# Patient Record
Sex: FEMALE | Race: WHITE | NOT HISPANIC OR LATINO | Employment: FULL TIME | URBAN - METROPOLITAN AREA
[De-identification: names, ages, dates, MRNs, and addresses within clinical notes are randomized per-mention and may not be internally consistent; named-entity substitution may affect disease eponyms.]

---

## 2017-01-20 ENCOUNTER — VIRTUAL VISIT (OUTPATIENT)
Dept: FAMILY MEDICINE | Facility: OTHER | Age: 37
End: 2017-01-20

## 2017-01-20 NOTE — PROGRESS NOTES
"Date: 2017 08:31:01  Clinician: Frederick Morrison  Clinician NPI: [id number]  Patient: Yvonne Sesay  Patient : 1980  Patient Address: [address] [place] [address]  Patient Phone: [phone]  Visit Protocol: URI  Patient Summary:  Yvonne is a 36 year old ( : 1980 ) female who initiated a Zip for a presumed sinus infection. When asked the question \"Do you have a [place] primary care physician?\", Yvonne responded \"Yes\".     Her symptoms started gradually 3-6 days ago and consist of loss of appetite, post-nasal drainage, cough, myalgias, malaise, rhinitis, nasal congestion, and ear pain.   She denies fever, chest pain, dysphagia, nausea, vomiting, itchy eyes, chills, sore throat, hoarse voice, and dyspnea. She denies a history of facial surgery.   Her moderate nasal secretions are yellow. Her mild facial pain or pressure feels worse when bending over or leaning forward and is located on one side of her head. The facial pain or pressure started after the onset of other URI symptoms.  Yvonne has a mild headache. The headache did not start before her other symptoms and is located on one side of her head. Yvonne does not have a history of migraine headaches. When asked to feel her neck she reported enlarged lymph nodes. Yvonne noted that the enlarged neck lymph nodes developed prior to the onset of URI symptoms. She denies axillary lymphadenopathy.   Regarding the ear pain, the patient denies experiencing pain when gently pulling on the earlobe, pain if the mouth is fully open or teeth are clenched, mastoid tenderness, tinnitus, and recent injury to the area around the ear.   She reports having mild ear pain on the ear canal area of both ears for 2-4 days. The patient hears normally despite the ear pain.   Yvonne reports having a feeling of fullness in the ear as if it is clogged on the following locations: Ear Canal.   Yvonne denies having redness, swelling, " and tenderness on her ear.   Additionally, she does not experience pain when bending the chin to the chest.   She has never had tympanostomy tube placement.    Her mild (a few coughs/hr) non-productive cough is more bothersome at night. She believes the cough is caused by post-nasal drainage.   She has passed urine in the past 12 hours.   Yvonne denies having COPD or other chronic lung disease.   Pulse: self-reported pulse rate as: 11 beats in 10 seconds.    Weight (in lbs): 130   She states she is not pregnant and denies breastfeeding. She has menstruated in the past month.   Yvonne does not smoke or use smokeless tobacco.       MEDICATIONS: No current medications, ALLERGIES: NKDA  Clinician Response:  Dear Yvonne,  Based on the information you have provided, you likely have a viral upper respiratory infection, otherwise known as a 'cold'.   Your symptoms are consistent with a possible sinus infection. Most sinus infections are caused by viruses and will resolve in the next few days without antibiotics. Antibiotics are only recommended if your sinus infection is accompanied by a high temperature or persists longer than 10 days.  The duration of your sinus symptoms do not meet the criteria for a bacterial infection or antibiotic treatment. However, if you continue to have sinus pain and pressure longer than 10 days, please consider doing another visit with us for additional evaluation and treatment recommendations.   Drink plenty of liquids, especially water and take time to rest your body. This may mean taking a nap or going to bed earlier. Your body is fighting an infection and liquids and rest will improve the pace of recovery. Remember to regularly wash your hands and avoid close contact with others to prevent spreading your infection.   Diagnosis: Viral URI  Diagnosis ICD: J06.9  Addendum created: May 16 11:25:18, 2020 created by: Radha Ellison body: Last visit I can see from oncare is from 1/20/2017.  Patient needs to do a new oncare visit for current assessment of symptoms. We could do a phone step up then if needed.    Thank You

## 2017-11-12 ENCOUNTER — HEALTH MAINTENANCE LETTER (OUTPATIENT)
Age: 37
End: 2017-11-12

## 2018-03-16 ENCOUNTER — ANESTHESIA (OUTPATIENT)
Dept: SURGERY | Facility: CLINIC | Age: 38
End: 2018-03-16
Payer: COMMERCIAL

## 2018-03-16 ENCOUNTER — HOSPITAL ENCOUNTER (OUTPATIENT)
Facility: CLINIC | Age: 38
Discharge: HOME | End: 2018-03-17
Attending: EMERGENCY MEDICINE | Admitting: SURGERY
Payer: COMMERCIAL

## 2018-03-16 ENCOUNTER — SURGERY (OUTPATIENT)
Age: 38
End: 2018-03-16

## 2018-03-16 ENCOUNTER — ANESTHESIA EVENT (OUTPATIENT)
Dept: SURGERY | Facility: CLINIC | Age: 38
End: 2018-03-16
Payer: COMMERCIAL

## 2018-03-16 ENCOUNTER — APPOINTMENT (OUTPATIENT)
Dept: CT IMAGING | Facility: CLINIC | Age: 38
End: 2018-03-16
Attending: EMERGENCY MEDICINE
Payer: COMMERCIAL

## 2018-03-16 ENCOUNTER — TELEPHONE (OUTPATIENT)
Dept: INTERNAL MEDICINE | Facility: CLINIC | Age: 38
End: 2018-03-16

## 2018-03-16 DIAGNOSIS — K35.30 ACUTE APPENDICITIS WITH LOCALIZED PERITONITIS: ICD-10-CM

## 2018-03-16 DIAGNOSIS — G89.18 ACUTE POST-OPERATIVE PAIN: Primary | ICD-10-CM

## 2018-03-16 LAB
ALBUMIN UR-MCNC: 10 MG/DL
ANION GAP SERPL CALCULATED.3IONS-SCNC: 5 MMOL/L (ref 3–14)
APPEARANCE UR: CLEAR
BASOPHILS # BLD AUTO: 0 10E9/L (ref 0–0.2)
BASOPHILS NFR BLD AUTO: 0.1 %
BILIRUB UR QL STRIP: NEGATIVE
BUN SERPL-MCNC: 11 MG/DL (ref 7–30)
CALCIUM SERPL-MCNC: 8.4 MG/DL (ref 8.5–10.1)
CHLORIDE SERPL-SCNC: 107 MMOL/L (ref 94–109)
CO2 SERPL-SCNC: 26 MMOL/L (ref 20–32)
COLOR UR AUTO: YELLOW
CREAT SERPL-MCNC: 0.74 MG/DL (ref 0.52–1.04)
DIFFERENTIAL METHOD BLD: ABNORMAL
EOSINOPHIL # BLD AUTO: 0.1 10E9/L (ref 0–0.7)
EOSINOPHIL NFR BLD AUTO: 0.4 %
ERYTHROCYTE [DISTWIDTH] IN BLOOD BY AUTOMATED COUNT: 11.9 % (ref 10–15)
GFR SERPL CREATININE-BSD FRML MDRD: 89 ML/MIN/1.7M2
GLUCOSE SERPL-MCNC: 95 MG/DL (ref 70–99)
GLUCOSE UR STRIP-MCNC: NEGATIVE MG/DL
HCG SERPL QL: NEGATIVE
HCT VFR BLD AUTO: 39.7 % (ref 35–47)
HGB BLD-MCNC: 13.6 G/DL (ref 11.7–15.7)
HGB UR QL STRIP: ABNORMAL
IMM GRANULOCYTES # BLD: 0 10E9/L (ref 0–0.4)
IMM GRANULOCYTES NFR BLD: 0.2 %
KETONES UR STRIP-MCNC: 10 MG/DL
LEUKOCYTE ESTERASE UR QL STRIP: NEGATIVE
LYMPHOCYTES # BLD AUTO: 1.9 10E9/L (ref 0.8–5.3)
LYMPHOCYTES NFR BLD AUTO: 13.6 %
MCH RBC QN AUTO: 29.8 PG (ref 26.5–33)
MCHC RBC AUTO-ENTMCNC: 34.3 G/DL (ref 31.5–36.5)
MCV RBC AUTO: 87 FL (ref 78–100)
MONOCYTES # BLD AUTO: 1.3 10E9/L (ref 0–1.3)
MONOCYTES NFR BLD AUTO: 8.9 %
MUCOUS THREADS #/AREA URNS LPF: PRESENT /LPF
NEUTROPHILS # BLD AUTO: 10.9 10E9/L (ref 1.6–8.3)
NEUTROPHILS NFR BLD AUTO: 76.8 %
NITRATE UR QL: NEGATIVE
NRBC # BLD AUTO: 0 10*3/UL
NRBC BLD AUTO-RTO: 0 /100
PH UR STRIP: 6 PH (ref 5–7)
PLATELET # BLD AUTO: 192 10E9/L (ref 150–450)
POTASSIUM SERPL-SCNC: 4.2 MMOL/L (ref 3.4–5.3)
RBC # BLD AUTO: 4.56 10E12/L (ref 3.8–5.2)
RBC #/AREA URNS AUTO: <1 /HPF (ref 0–2)
SODIUM SERPL-SCNC: 138 MMOL/L (ref 133–144)
SOURCE: ABNORMAL
SP GR UR STRIP: 1.02 (ref 1–1.03)
SQUAMOUS #/AREA URNS AUTO: 1 /HPF (ref 0–1)
UROBILINOGEN UR STRIP-MCNC: NORMAL MG/DL (ref 0–2)
WBC # BLD AUTO: 14.2 10E9/L (ref 4–11)
WBC #/AREA URNS AUTO: 1 /HPF (ref 0–5)

## 2018-03-16 PROCEDURE — 25000566 ZZH SEVOFLURANE, EA 15 MIN: Performed by: SURGERY

## 2018-03-16 PROCEDURE — 88304 TISSUE EXAM BY PATHOLOGIST: CPT | Mod: 26 | Performed by: SURGERY

## 2018-03-16 PROCEDURE — 25000128 H RX IP 250 OP 636: Performed by: EMERGENCY MEDICINE

## 2018-03-16 PROCEDURE — 96365 THER/PROPH/DIAG IV INF INIT: CPT | Mod: 59

## 2018-03-16 PROCEDURE — 25000125 ZZHC RX 250: Performed by: EMERGENCY MEDICINE

## 2018-03-16 PROCEDURE — 27210995 ZZH RX 272: Performed by: SURGERY

## 2018-03-16 PROCEDURE — 25000125 ZZHC RX 250: Performed by: SURGERY

## 2018-03-16 PROCEDURE — 25000128 H RX IP 250 OP 636: Performed by: SURGERY

## 2018-03-16 PROCEDURE — 25000128 H RX IP 250 OP 636: Performed by: ANESTHESIOLOGY

## 2018-03-16 PROCEDURE — 37000009 ZZH ANESTHESIA TECHNICAL FEE, EACH ADDTL 15 MIN: Performed by: SURGERY

## 2018-03-16 PROCEDURE — C9399 UNCLASSIFIED DRUGS OR BIOLOG: HCPCS | Performed by: NURSE ANESTHETIST, CERTIFIED REGISTERED

## 2018-03-16 PROCEDURE — 81001 URINALYSIS AUTO W/SCOPE: CPT | Performed by: EMERGENCY MEDICINE

## 2018-03-16 PROCEDURE — 25800025 ZZH RX 258: Performed by: SURGERY

## 2018-03-16 PROCEDURE — 74177 CT ABD & PELVIS W/CONTRAST: CPT

## 2018-03-16 PROCEDURE — 36000058 ZZH SURGERY LEVEL 3 EA 15 ADDTL MIN: Performed by: SURGERY

## 2018-03-16 PROCEDURE — 40000169 ZZH STATISTIC PRE-PROCEDURE ASSESSMENT I: Performed by: SURGERY

## 2018-03-16 PROCEDURE — 44970 LAPAROSCOPY APPENDECTOMY: CPT | Performed by: SURGERY

## 2018-03-16 PROCEDURE — 25000125 ZZHC RX 250: Performed by: NURSE ANESTHETIST, CERTIFIED REGISTERED

## 2018-03-16 PROCEDURE — 27210794 ZZH OR GENERAL SUPPLY STERILE: Performed by: SURGERY

## 2018-03-16 PROCEDURE — 71000012 ZZH RECOVERY PHASE 1 LEVEL 1 FIRST HR: Performed by: SURGERY

## 2018-03-16 PROCEDURE — 84703 CHORIONIC GONADOTROPIN ASSAY: CPT | Performed by: EMERGENCY MEDICINE

## 2018-03-16 PROCEDURE — 85025 COMPLETE CBC W/AUTO DIFF WBC: CPT | Performed by: EMERGENCY MEDICINE

## 2018-03-16 PROCEDURE — 25000128 H RX IP 250 OP 636: Performed by: NURSE ANESTHETIST, CERTIFIED REGISTERED

## 2018-03-16 PROCEDURE — 88304 TISSUE EXAM BY PATHOLOGIST: CPT | Performed by: SURGERY

## 2018-03-16 PROCEDURE — 99285 EMERGENCY DEPT VISIT HI MDM: CPT | Mod: 25

## 2018-03-16 PROCEDURE — 80048 BASIC METABOLIC PNL TOTAL CA: CPT | Performed by: EMERGENCY MEDICINE

## 2018-03-16 PROCEDURE — 37000008 ZZH ANESTHESIA TECHNICAL FEE, 1ST 30 MIN: Performed by: SURGERY

## 2018-03-16 PROCEDURE — 36000056 ZZH SURGERY LEVEL 3 1ST 30 MIN: Performed by: SURGERY

## 2018-03-16 PROCEDURE — 99203 OFFICE O/P NEW LOW 30 MIN: CPT | Mod: 57 | Performed by: SURGERY

## 2018-03-16 RX ORDER — DEXAMETHASONE SODIUM PHOSPHATE 4 MG/ML
INJECTION, SOLUTION INTRA-ARTICULAR; INTRALESIONAL; INTRAMUSCULAR; INTRAVENOUS; SOFT TISSUE PRN
Status: DISCONTINUED | OUTPATIENT
Start: 2018-03-16 | End: 2018-03-16

## 2018-03-16 RX ORDER — ONDANSETRON 4 MG/1
4 TABLET, ORALLY DISINTEGRATING ORAL EVERY 30 MIN PRN
Status: DISCONTINUED | OUTPATIENT
Start: 2018-03-16 | End: 2018-03-17 | Stop reason: HOSPADM

## 2018-03-16 RX ORDER — ONDANSETRON 2 MG/ML
4 INJECTION INTRAMUSCULAR; INTRAVENOUS EVERY 30 MIN PRN
Status: DISCONTINUED | OUTPATIENT
Start: 2018-03-16 | End: 2018-03-17 | Stop reason: HOSPADM

## 2018-03-16 RX ORDER — HYDRALAZINE HYDROCHLORIDE 20 MG/ML
2.5-5 INJECTION INTRAMUSCULAR; INTRAVENOUS EVERY 10 MIN PRN
Status: DISCONTINUED | OUTPATIENT
Start: 2018-03-16 | End: 2018-03-17 | Stop reason: HOSPADM

## 2018-03-16 RX ORDER — IOPAMIDOL 755 MG/ML
68 INJECTION, SOLUTION INTRAVASCULAR ONCE
Status: COMPLETED | OUTPATIENT
Start: 2018-03-16 | End: 2018-03-16

## 2018-03-16 RX ORDER — PROPOFOL 10 MG/ML
INJECTION, EMULSION INTRAVENOUS CONTINUOUS PRN
Status: DISCONTINUED | OUTPATIENT
Start: 2018-03-16 | End: 2018-03-16

## 2018-03-16 RX ORDER — ALBUTEROL SULFATE 0.83 MG/ML
2.5 SOLUTION RESPIRATORY (INHALATION) EVERY 4 HOURS PRN
Status: DISCONTINUED | OUTPATIENT
Start: 2018-03-16 | End: 2018-03-17 | Stop reason: HOSPADM

## 2018-03-16 RX ORDER — PROPOFOL 10 MG/ML
INJECTION, EMULSION INTRAVENOUS PRN
Status: DISCONTINUED | OUTPATIENT
Start: 2018-03-16 | End: 2018-03-16

## 2018-03-16 RX ORDER — MAGNESIUM HYDROXIDE 1200 MG/15ML
LIQUID ORAL PRN
Status: DISCONTINUED | OUTPATIENT
Start: 2018-03-16 | End: 2018-03-17 | Stop reason: HOSPADM

## 2018-03-16 RX ORDER — LIDOCAINE HYDROCHLORIDE 20 MG/ML
INJECTION, SOLUTION INFILTRATION; PERINEURAL PRN
Status: DISCONTINUED | OUTPATIENT
Start: 2018-03-16 | End: 2018-03-16

## 2018-03-16 RX ORDER — SODIUM CHLORIDE, SODIUM LACTATE, POTASSIUM CHLORIDE, CALCIUM CHLORIDE 600; 310; 30; 20 MG/100ML; MG/100ML; MG/100ML; MG/100ML
INJECTION, SOLUTION INTRAVENOUS CONTINUOUS
Status: DISCONTINUED | OUTPATIENT
Start: 2018-03-16 | End: 2018-03-16 | Stop reason: HOSPADM

## 2018-03-16 RX ORDER — LABETALOL HYDROCHLORIDE 5 MG/ML
10 INJECTION, SOLUTION INTRAVENOUS
Status: DISCONTINUED | OUTPATIENT
Start: 2018-03-16 | End: 2018-03-17 | Stop reason: HOSPADM

## 2018-03-16 RX ORDER — FENTANYL CITRATE 50 UG/ML
50 INJECTION, SOLUTION INTRAMUSCULAR; INTRAVENOUS
Status: DISCONTINUED | OUTPATIENT
Start: 2018-03-16 | End: 2018-03-16 | Stop reason: HOSPADM

## 2018-03-16 RX ORDER — PIPERACILLIN SODIUM, TAZOBACTAM SODIUM 3; .375 G/15ML; G/15ML
3.38 INJECTION, POWDER, LYOPHILIZED, FOR SOLUTION INTRAVENOUS ONCE
Status: COMPLETED | OUTPATIENT
Start: 2018-03-16 | End: 2018-03-16

## 2018-03-16 RX ORDER — HYDROCODONE BITARTRATE AND ACETAMINOPHEN 5; 325 MG/1; MG/1
1-2 TABLET ORAL EVERY 4 HOURS PRN
Qty: 20 TABLET | Refills: 0 | Status: SHIPPED | OUTPATIENT
Start: 2018-03-16 | End: 2018-10-23

## 2018-03-16 RX ORDER — FENTANYL CITRATE 50 UG/ML
INJECTION, SOLUTION INTRAMUSCULAR; INTRAVENOUS PRN
Status: DISCONTINUED | OUTPATIENT
Start: 2018-03-16 | End: 2018-03-16

## 2018-03-16 RX ORDER — ONDANSETRON 2 MG/ML
INJECTION INTRAMUSCULAR; INTRAVENOUS PRN
Status: DISCONTINUED | OUTPATIENT
Start: 2018-03-16 | End: 2018-03-16

## 2018-03-16 RX ORDER — SODIUM CHLORIDE, SODIUM LACTATE, POTASSIUM CHLORIDE, CALCIUM CHLORIDE 600; 310; 30; 20 MG/100ML; MG/100ML; MG/100ML; MG/100ML
INJECTION, SOLUTION INTRAVENOUS CONTINUOUS
Status: DISCONTINUED | OUTPATIENT
Start: 2018-03-16 | End: 2018-03-17 | Stop reason: HOSPADM

## 2018-03-16 RX ORDER — KETOROLAC TROMETHAMINE 30 MG/ML
INJECTION, SOLUTION INTRAMUSCULAR; INTRAVENOUS PRN
Status: DISCONTINUED | OUTPATIENT
Start: 2018-03-16 | End: 2018-03-16

## 2018-03-16 RX ORDER — FENTANYL CITRATE 0.05 MG/ML
25-50 INJECTION, SOLUTION INTRAMUSCULAR; INTRAVENOUS
Status: DISCONTINUED | OUTPATIENT
Start: 2018-03-16 | End: 2018-03-17 | Stop reason: HOSPADM

## 2018-03-16 RX ORDER — HYDROMORPHONE HYDROCHLORIDE 1 MG/ML
.3-.5 INJECTION, SOLUTION INTRAMUSCULAR; INTRAVENOUS; SUBCUTANEOUS EVERY 5 MIN PRN
Status: DISCONTINUED | OUTPATIENT
Start: 2018-03-16 | End: 2018-03-17 | Stop reason: HOSPADM

## 2018-03-16 RX ADMIN — LIDOCAINE HYDROCHLORIDE 10 ML: 10 INJECTION, SOLUTION INFILTRATION; PERINEURAL at 22:49

## 2018-03-16 RX ADMIN — PROPOFOL 200 MG: 10 INJECTION, EMULSION INTRAVENOUS at 22:04

## 2018-03-16 RX ADMIN — MIDAZOLAM 2 MG: 1 INJECTION INTRAMUSCULAR; INTRAVENOUS at 21:58

## 2018-03-16 RX ADMIN — FENTANYL CITRATE 50 MCG: 50 INJECTION, SOLUTION INTRAMUSCULAR; INTRAVENOUS at 20:57

## 2018-03-16 RX ADMIN — SODIUM CHLORIDE 1000 ML: 900 IRRIGANT IRRIGATION at 22:15

## 2018-03-16 RX ADMIN — ROCURONIUM BROMIDE 35 MG: 10 INJECTION INTRAVENOUS at 22:13

## 2018-03-16 RX ADMIN — ONDANSETRON 4 MG: 2 INJECTION INTRAMUSCULAR; INTRAVENOUS at 22:15

## 2018-03-16 RX ADMIN — SUCCINYLCHOLINE CHLORIDE 100 MG: 20 INJECTION, SOLUTION INTRAMUSCULAR; INTRAVENOUS; PARENTERAL at 22:04

## 2018-03-16 RX ADMIN — PROPOFOL 50 MCG/KG/MIN: 10 INJECTION, EMULSION INTRAVENOUS at 22:11

## 2018-03-16 RX ADMIN — PIPERACILLIN AND TAZOBACTAM 3.38 G: 3; .375 INJECTION, POWDER, FOR SOLUTION INTRAVENOUS at 22:11

## 2018-03-16 RX ADMIN — SODIUM CHLORIDE, POTASSIUM CHLORIDE, SODIUM LACTATE AND CALCIUM CHLORIDE: 600; 310; 30; 20 INJECTION, SOLUTION INTRAVENOUS at 20:58

## 2018-03-16 RX ADMIN — DEXAMETHASONE SODIUM PHOSPHATE 4 MG: 4 INJECTION, SOLUTION INTRA-ARTICULAR; INTRALESIONAL; INTRAMUSCULAR; INTRAVENOUS; SOFT TISSUE at 22:11

## 2018-03-16 RX ADMIN — KETOROLAC TROMETHAMINE 30 MG: 30 INJECTION, SOLUTION INTRAMUSCULAR at 22:48

## 2018-03-16 RX ADMIN — IOPAMIDOL 68 ML: 755 INJECTION, SOLUTION INTRAVENOUS at 14:25

## 2018-03-16 RX ADMIN — SODIUM CHLORIDE 1000 ML: 9 INJECTION, SOLUTION INTRAVENOUS at 15:32

## 2018-03-16 RX ADMIN — ROCURONIUM BROMIDE 5 MG: 10 INJECTION INTRAVENOUS at 22:04

## 2018-03-16 RX ADMIN — LIDOCAINE HYDROCHLORIDE 100 MG: 20 INJECTION, SOLUTION INFILTRATION; PERINEURAL at 22:04

## 2018-03-16 RX ADMIN — SUGAMMADEX 120 MG: 100 INJECTION, SOLUTION INTRAVENOUS at 22:48

## 2018-03-16 RX ADMIN — SODIUM CHLORIDE 60 ML: 9 INJECTION, SOLUTION INTRAVENOUS at 14:26

## 2018-03-16 RX ADMIN — DEXMEDETOMIDINE HYDROCHLORIDE 12 MCG: 100 INJECTION, SOLUTION INTRAVENOUS at 22:12

## 2018-03-16 RX ADMIN — PIPERACILLIN SODIUM,TAZOBACTAM SODIUM 3.38 G: 3; .375 INJECTION, POWDER, FOR SOLUTION INTRAVENOUS at 15:28

## 2018-03-16 RX ADMIN — FENTANYL CITRATE 100 MCG: 50 INJECTION, SOLUTION INTRAMUSCULAR; INTRAVENOUS at 22:04

## 2018-03-16 ASSESSMENT — ENCOUNTER SYMPTOMS
NAUSEA: 1
VOMITING: 1
ABDOMINAL PAIN: 1
DIARRHEA: 0
FEVER: 0

## 2018-03-16 NOTE — PROGRESS NOTES
Pt laying in bed states tolerable RLQ pain. Declines any med intervention at this time. Waiting to go to OR. NPO. Will continue to monitor.

## 2018-03-16 NOTE — PROGRESS NOTES
RECEIVING UNIT ED HANDOFF REVIEW    ED Nurse Handoff Report was reviewed by: Sophia Larry on March 16, 2018 at 6:04 PM

## 2018-03-16 NOTE — H&P
[facility]  [facility] - H&P     Yvonne Sesay MRN# [id number]   Age: 37 year old YOB: 1980     HPI:  Patient has been experiencing acute RLQ abdominal pain for the past 1.5 days associated with chills, nausea and anorexia.  These symptoms have been increasing in severity.  Originally felt the pain yesterday after dinner.  Slept poorly and felt worse this morning.  Had some chills and associated nausea.  Presented to the emergency department with abdominal pain and an exam suspicious for acute appendicitis.  Was found to have a leukocytosis and CT scan suggested acute appendicitis.  No previous abdominal surgeries.    History is obtained from the patient    Review Of Systems:  Respiratory: No shortness of breath, dyspnea on exertion, cough, or hemoptysis  Cardiovascular: negative  Gastrointestinal: as above  Genitourinary: negative    PMH:  Past Medical History:   Diagnosis Date     MTHFR mutation (methylenetetrahydrofolate reductase) (H)      RPLS (reversible posterior leukoencephalopathy syndrome)        PSH:  Past Surgical History:   Procedure Laterality Date     ------------OTHER-------------      mole removal     teeth extraction[         Allergies:  No Known Allergies    Home Medications:  Current Outpatient Prescriptions   Medication Sig Dispense Refill     ibuprofen (ADVIL,MOTRIN) 400-800 mg tablet Take 1-2 tablets (400-800 mg) by mouth every 6 hours as needed for other (cramping) 90 tablet 0     folic acid (FOLATE) 500 mcg/mL SOLN Take 800 mcg by mouth daily       Prenatal MV-Min-Fe Fum-FA-DHA (PRENATAL 1 PO)          Social History:  Social History   Substance Use Topics     Smoking status: Current Every Day Smoker     Packs/day: 0.50     Types: Cigarettes     Smokeless tobacco: Never Used      Comment: would like information in regards to smoking cessation post delivery.     Alcohol use 0.0 oz/week      Comment: occ       Family History:  No family history  chronic diarrhea, inflammatory bowel disease or colon cancer.    Objective:  /77  Pulse 99  Temp 98.3  F (36.8  C) (Oral)  Resp 16  Wt 61.2 kg (135 lb)  SpO2 100%  BMI 22.47 kg/m2    General appearance: healthy, alert and mild distress  Hydration: mildly dehydrated  Neck: normal, supple and no adenopathy  Lungs: normal and clear to auscultation  Heart: regular rate and rhythm and no murmurs, clicks, or gallops  Abdomen: flat, hypoactive bowel sounds.   Tenderness: present: suprapubic and RLQ moderate  Masses: none  Organomegaly: none    Labs Reviewed:  Recent Labs      03/16/18   1259   HGB  13.6   WBC  14.2*       Radiology:  CT abdomen reveals a dilated, thick-walled appendix with surrounding fat stranding.  No evidence for perforation or abscess  All imaging studies reviewed by me.    ASSESSMENT/PLAN:  The patient's history, physical exam, laboratory and imaging studies are suspicious for acute appendicitis.  I have offered the patient laparoscopic appendectomy.  The risks, benefits, and alternatives have been discussed in detail.  All of the patient's questions have been answered.  They elect to proceed and we will go to the OR at the soonest availability.  Pre-operative antibiotics have been ordered.     Bertram Elder MD

## 2018-03-16 NOTE — IP AVS SNAPSHOT
MRN:[id number]                      After Visit Summary   3/16/2018    Yvonne Sesay    MRN: [id number]           Thank you!     Thank you for choosing [facility] for your care. Our goal is always to provide you with excellent care. Hearing back from our patients is one way we can continue to improve our services. Please take a few minutes to complete the written survey that you may receive in the mail after you visit with us. Thank you!        Patient Information     Date Of Birth          1980        About your hospital stay     You were admitted on:  March 17, 2018 [facility]    You were discharged on:  March 17, 2018       Who to Call     For medical emergencies, please call 911.  For non-urgent questions about your medical care, please call your primary care provider or clinic, [phone]  For questions related to your surgery, please call your surgery clinic        Attending Provider     Provider Specialty    Darshana Navarrete MD Emergency Medicine    Natalya, Pamella Morillo MD Emergency Medicine    Jammie Greenwood MD Surgery       Primary Care Provider Office Phone # Fax #    Claudio Ben Menon [phone]      After Care Instructions     Discharge Instructions       [facility]  Discharge Instructions: Post-Operative Appendectomy    ACTIVITY  Increase your activity gradually.  Avoid strenuous physical activity or heavy lifting greater than 15-20 lbs. for 2-3 weeks.  You may climb stairs.  You may drive without restrictions when you are not using any prescription pain medication and comfortable in a car.  You may return to work/school when you are comfortable without any prescription pain medication.    WOUND CARE  You may remove your bandage and shower 48 hours after the surgery.  Pat your incisions dry and leave open to air.  Re-apply dressing (Band-aid or gauze/tape) as needed  for drainage.  You may have steri-strips (looks like tape) or Dermabond (looks like glue) on your incision.  Leave it alone, it will peel up and fall off on its own.   Do not soak your incisions in a tub or pool for 2 weeks.     DIET  Return to the diet you were on before surgery.  Drink plenty of liquids to stay hydrated.    PAIN  Expect some tenderness and discomfort at the incision sites.  Use the prescribed pain medication at your discretion.  Expect gradual resolution of your pain over several days.  You may take ibuprofen with food (unless you have been told not to) instead of or in addition to your prescribed pain medication.  If you are taking Norco or Percocet, do not take any additional acetaminophen/APAP/Tylenol.  Do not drink alcohol or drive while you are taking pain medications.  You may apply ice to your incisions in 20 minute intervals as needed for the next 48 hours.  After that time, consider switching to heat if you prefer.    RETURN APPOINTMENT  Follow up with your surgeon or a PA in 2 weeks.  Please call the office at [phone] to schedule your appointment. We are located at [address] [place] [address].    CALL OUR OFFICE IF YOU HAVE:   Chills or fever above 101.5 F.  Increased redness or drainage at your incisions.  Significant bleeding.  Pain not relieved by your pain medication or rest.  Increasing pain after the first 48 hours.  Any other concerns or questions.    HELPFUL HINTS  Pain medications can cause constipation.  Limit use when possible.  Take over the counter stool softener/stimulant, such as Colace or Senna, with plenty of water.  You may take a mild over the counter laxative, such as Miralax or a suppository, as needed.   For laparoscopic surgery, you may have shoulder or upper back discomfort due to the gas used in surgery.  This is temporary and should resolve in 48-72 hours.  Short, frequent walks may help with this.            Ice to affected area     "   Ice to operative site PRN                  Additional Information     If you use hormonal birth control (such as the pill, patch, ring or implants): You'll need a second form of birth control for 7 days (condoms, a diaphragm or contraceptive foam). While in the hospital, you received a medicine called Bridion. Your normal birth control will not work as well for a week after taking this medicine.          Pending Results     Date and Time Order Name Status Description    3/16/2018 1308 CT Abdomen Pelvis w Contrast Preliminary             Admission Information     Date & Time Provider Department Dept. Phone    3/16/2018 Jammie Greenwood MD [facility] [phone]      Your Vitals Were     Blood Pressure Pulse Temperature Respirations Height Weight    121/71 99 96  F (35.6  C) (Oral) 16 1.676 m (5' 6\") 61 kg (134 lb 8 oz)    Pulse Oximetry BMI (Body Mass Index)                97% 21.71 kg/m2          MyChart Information     [employer] gives you secure access to your electronic health record. If you see a primary care provider, you can also send messages to your care team and make appointments. If you have questions, please call your primary care clinic.  If you do not have a primary care provider, please call [phone] and they will assist you.        Care EveryWhere ID     This is your Care EveryWhere ID. This could be used by other organizations to access your [place] medical records  [id number]        Equal Access to Services     MARLENE COLON : [place], wasaundra lundy, qaybta kaaldemar steele. So [facility] [phone].    ATENCIÓN: Si habla español, tiene a jo disposición servicios gratuitos de asistencia lingüística. Llame al [phone].    We comply with applicable federal civil rights laws and [place] laws. We do not discriminate on the basis of race, color, national origin, age, disability, sex, sexual orientation, or " gender identity.               Review of your medicines      START taking        Dose / Directions    HYDROcodone-acetaminophen 5-325 MG per tablet   Commonly known as:  NORCO   Used for:  Acute appendicitis with localized peritonitis, Acute post-operative pain        Dose:  1-2 tablet   Take 1-2 tablets by mouth every 4 hours as needed for other (Moderate to Severe Pain)   Quantity:  20 tablet   Refills:  0       senna-docusate 8.6-50 MG per tablet   Commonly known as:  SENOKOT-S;PERICOLACE   Used for:  Acute post-operative pain, Acute appendicitis with localized peritonitis        Dose:  1-2 tablet   Take 1-2 tablets by mouth 2 times daily Take while on oral narcotics to prevent or treat constipation.   Quantity:  14 tablet   Refills:  0            Where to get your medicines      These medications were sent to [facility] - [address]  [address], [place] [address]     Phone:  [phone]     senna-docusate 8.6-50 MG per tablet         Some of these will need a paper prescription and others can be bought over the counter. Ask your nurse if you have questions.     Bring a paper prescription for each of these medications     HYDROcodone-acetaminophen 5-325 MG per tablet                Protect others around you: Learn how to safely use, store and throw away your medicines at www.disposemymeds.org.        Information about OPIOIDS     PRESCRIPTION OPIOIDS: WHAT YOU NEED TO KNOW    Prescription opioids can be used to help relieve moderate to severe pain and are often prescribed following a surgery or injury, or for certain health conditions. These medications can be an important part of treatment but also come with serious risks. It is important to work with your health care provider to make sure you are getting the safest, most effective care.    WHAT ARE THE RISKS AND SIDE EFFECTS OF OPIOID USE?  Prescription opioids carry serious risks of addiction and overdose,  especially with prolonged use. An opioid overdose, often marked by slowed breathing can cause sudden death. The use of prescription opioids can have a number of side effects as well, even when taken as directed:      Tolerance - meaning you might need to take more of a medication for the same pain relief    Physical dependence - meaning you have symptoms of withdrawal when a medication is stopped    Increased sensitivity to pain    Constipation    Nausea, vomiting, and dry mouth    Sleepiness and dizziness    Confusion    Depression    Low levels of testosterone that can result in lower sex drive, energy, and strength    Itching and sweating    RISKS ARE GREATER WITH:    History of drug misuse, substance use disorder, or overdose    Mental health conditions (such as depression or anxiety)    Sleep apnea    Older age (65 years or older)    Pregnancy    Avoid alcohol while taking prescription opioids.   Also, unless specifically advised by your health care provider, medications to avoid include:    Benzodiazepines (such as Xanax or Valium)    Muscle relaxants (such as Soma or Flexeril)    Hypnotics (such as Ambien or Lunesta)    Other prescription opioids    KNOW YOUR OPTIONS:  Talk to your health care provider about ways to manage your pain that do not involve prescription opioids. Some of these options may actually work better and have fewer risks and side effects:    Pain relievers such as acetaminophen, ibuprofen, and naproxen    Some medications that are also used for depression or seizures    Physical therapy and exercise    Cognitive behavioral therapy, a psychological, goal-directed approach, in which patients learn how to modify physical, behavioral, and emotional triggers of pain and stress    IF YOU ARE PRESCRIBED OPIOIDS FOR PAIN:    Never take opioids in greater amounts or more often than prescribed    Follow up with your primary health care provider and work together to create a plan on how to manage  your pain.    Talk about ways to help manage your pain that do not involve prescription opioids    Talk about all concerns and side effects    Help prevent misuse and abuse    Never sell or share prescription opioids    Never use another person's prescription opioids    Store prescription opioids in a secure place and out of reach of others (this may include visitors, children, friends, and family)    Visit www.cdc.gov/drugoverdose to learn about risks of opioid abuse and overdose    If you believe you may be struggling with addiction, tell your health care provider and ask for guidance or call [facility]'s National Helpline at 7-320-435-HELP    LEARN MORE / www.cdc.gov/drugoverdose/prescribing/guideline.html    Safely dispose of unused prescription opioids: Find your local drug take-back programs and more information about the importance of safe disposal at www.doseofreality.[place].gov             Medication List: This is a list of all your medications and when to take them. Check marks below indicate your daily home schedule. Keep this list as a reference.      Medications           Morning Afternoon Evening Bedtime As Needed    HYDROcodone-acetaminophen 5-325 MG per tablet   Commonly known as:  NORCO   Take 1-2 tablets by mouth every 4 hours as needed for other (Moderate to Severe Pain)   Last time this was given:  1 tablet on 3/17/2018  8:33 AM                                   senna-docusate 8.6-50 MG per tablet   Commonly known as:  SENOKOT-S;PERICOLACE   Take 1-2 tablets by mouth 2 times daily Take while on oral narcotics to prevent or treat constipation.

## 2018-03-16 NOTE — PHARMACY-ADMISSION MEDICATION HISTORY
Admission medication history interview status for the 3/16/2018  admission is complete. See EPIC admission navigator for prior to admission medications     Medication history source reliability:Good    Actions taken by pharmacist (provider contacted, etc):Patient denies taking any Rx or OTC medications at home on a regular basis.      Additional medication history information not noted on PTA med list :None    Medication reconciliation/reorder completed by provider prior to medication history? No    Time spent in this activity: 5 minutes

## 2018-03-16 NOTE — ED PROVIDER NOTES
History     Chief Complaint:  Abdominal Pain    HPI   Yvonne Sesay is a 37 year old female who presents to the emergency department today for evaluation of diffuse periumbilical abdominal pain that began around 17:00 last night. She reports some difficulty with sleeping on her sides with the most discomfort being on the right side, but sleeping on her back was comfortable. The patient reports nausea/vomiting during the night and she did have one bowel movement as well, but denies having any diarrhea. She did not take anything for the pain. The patient reports never having any previous abdominal surgeries. She denies any urinary symptoms or fever.     Allergies:  No Known Drug Allergies      Medications:    The patient is currently on no regular medications.      Past Medical History:    Methylenetetrahydrofolate reductase mutation  Reversible posterior leukoencephalopathy syndrome     Past Surgical History:    Mole removal   Teeth extraction     Family History:    Prostate cancer   Hyperlipidemia    Social History:  The patient was accompanied to the ED by cousin.  Smoking Status: current, 0.5 ppd   Alcohol Use: occasionally   Marital Status:   [2]     Review of Systems   Constitutional: Negative for fever.   Gastrointestinal: Positive for abdominal pain, nausea and vomiting. Negative for diarrhea.   Genitourinary: Negative.    All other systems reviewed and are negative.    Physical Exam     Patient Vitals for the past 24 hrs:   BP Temp Temp src Pulse Heart Rate Resp SpO2 Weight   03/16/18 1210 125/77 98.3  F (36.8  C) Oral 99 99 16 100 % 61.2 kg (135 lb)      Physical Exam  General: Well-nourished, laying still on cart  Eyes: PERRL, conjunctivae pink no scleral icterus or conjunctival injection  ENT:  Moist mucus membranes, posterior oropharynx clear without erythema or exudates  Respiratory:  Lungs clear to auscultation bilaterally, no crackles/rubs/wheezes.  Good air movement  CV: Normal rate and  rhythm, no murmurs/rubs/gallops  GI:  Abdomen soft and non-distended.  Normoactive BS.  Moderate tenderness over right lower and mid abdomen, with localized rebound, no guarding   Skin: Warm, dry.  No rashes or petechiae  Musculoskeletal: No peripheral edema or calf tenderness  Neuro: Alert and oriented to person/place/time  Psychiatric: Normal affect      Emergency Department Course     Imaging:  Radiology findings were communicated with the patient who voiced understanding of the findings.    CT Abdomen Pelvis w Contrast  Probable early acute appendicitis. No evidence of rupture.  Reading per radiology     Laboratory:  Laboratory findings were communicated with the patient who voiced understanding of the findings.  CBC: WBC 14.2 (H), HGB 13.6,   BMP: Ca 8.4 (L), o/w WNL (Creatinine 0.74)  UA with micro: Trace blood (A), Albumin 10 (A), Ketones 10 (A), mucous present (A) o/w negative  HCG Qualitative blood: negative      Interventions:  1426 Zosyn 3.375 g IV   1532 NS, 1 L, IV     Emergency Department Course:  Nursing notes and vitals reviewed.  I entered the room.  I performed an exam of the patient as documented above.   IV was inserted and blood was drawn for laboratory testing, results above.  The patient provided a urine sample here in the emergency department. This was sent for laboratory testing, findings above.   The patient was sent for CT Scan while in the emergency department, results above.   The patient received the above intervention(s).   1445 the patient was rechecked and updated the patient regarding the results of the laboratory and imaging studies.    255 I spoke with Dr. Elder of the general surgery service regarding patient's presentation, findings, and plan of care. He evaluated the patient in the ED and the plan is for appendectomy.  I discussed the treatment plan with the patient. They expressed understanding of this plan and consented to admission. I discussed the patient with   Jael, who will admit the patient to a monitored bed for further evaluation and treatment.     Impression & Plan      Medical Decision Making:  Yvonne Sesay is a 37 year old female who presents to the emergency department today for evaluation of generalized abdominal pain that has progressed to right lower quadrant abdominal pain with associated nausea and vomiting. CT scan confirms appendicitis.  There is no evidence of rupture or abscess at this time. Pain has been controlled with interventions in the Emergency Department.  Parenteral antibiotics have been administered in the Emergency Department. The case was discussed with the on call surgeon Dr. Elder who saw the patient in the ED and she will be going to the operating room today.       Diagnosis:    ICD-10-CM    1. Acute appendicitis with localized peritonitis K35.3      Disposition:   The patient was admitted to the hospital for further evaluation and care.    Scribe Disclosure:  I, Quentin Nava, am serving as a scribe on 3/16/2018 to document services personally performed by Darshana Navarrete MD, based on my observations and the provider's statements to me.   EMERGENCY DEPARTMENT       Darshana Navarrete MD  03/16/18 1600

## 2018-03-16 NOTE — LETTER
[facility] OBSERVATION UNIT  [address] [facility] [address]  [phone]          March 17, 2018    RE:  Yvonne Sesay                                                                                                                                                       [address]  [facility] [address]            To whom it may concern:    Yvonne Sesay is under my professional care following surgery. She will be able to return to work on April 2, 2018. Please call with questions or concerns.     Sincerely,        Jammie Greenwood MD  [facility]  [phone]

## 2018-03-16 NOTE — IP AVS SNAPSHOT
[facility] Observation Unit    [address] [place] [id number]    Phone:  [phone]                                       After Visit Summary   3/16/2018    Yvonne Sesay    MRN: [id number]           After Visit Summary Signature Page     I have received my discharge instructions, and my questions have been answered. I have discussed any challenges I see with this plan with the nurse or doctor.    ..........................................................................................................................................  Patient/Patient Representative Signature      ..........................................................................................................................................  Patient Representative Print Name and Relationship to Patient    ..................................................               ................................................  Date                                            Time    ..........................................................................................................................................  Reviewed by Signature/Title    ...................................................              ..............................................  Date                                                            Time

## 2018-03-16 NOTE — ED NOTES
Bed: ED30  Expected date: 3/16/18  Expected time: 12:13 PM  Means of arrival:   Comments:  Triage

## 2018-03-17 VITALS
WEIGHT: 134.5 LBS | HEIGHT: 66 IN | BODY MASS INDEX: 21.62 KG/M2 | HEART RATE: 99 BPM | SYSTOLIC BLOOD PRESSURE: 121 MMHG | RESPIRATION RATE: 16 BRPM | OXYGEN SATURATION: 97 % | TEMPERATURE: 96 F | DIASTOLIC BLOOD PRESSURE: 71 MMHG

## 2018-03-17 PROBLEM — K37 APPENDICITIS: Status: ACTIVE | Noted: 2018-03-17

## 2018-03-17 LAB — GLUCOSE BLDC GLUCOMTR-MCNC: 88 MG/DL (ref 70–99)

## 2018-03-17 PROCEDURE — 40000934 ZZH STATISTIC OUTPATIENT (NON-OBS) DAY

## 2018-03-17 PROCEDURE — 25000132 ZZH RX MED GY IP 250 OP 250 PS 637: Performed by: SURGERY

## 2018-03-17 PROCEDURE — 25000128 H RX IP 250 OP 636: Performed by: SURGERY

## 2018-03-17 PROCEDURE — 82962 GLUCOSE BLOOD TEST: CPT

## 2018-03-17 RX ORDER — ONDANSETRON 4 MG/1
4 TABLET, ORALLY DISINTEGRATING ORAL EVERY 6 HOURS PRN
Status: DISCONTINUED | OUTPATIENT
Start: 2018-03-17 | End: 2018-03-17

## 2018-03-17 RX ORDER — ONDANSETRON 2 MG/ML
4 INJECTION INTRAMUSCULAR; INTRAVENOUS EVERY 6 HOURS PRN
Status: DISCONTINUED | OUTPATIENT
Start: 2018-03-17 | End: 2018-03-17

## 2018-03-17 RX ORDER — HYDROCODONE BITARTRATE AND ACETAMINOPHEN 5; 325 MG/1; MG/1
1-2 TABLET ORAL EVERY 4 HOURS PRN
Status: DISCONTINUED | OUTPATIENT
Start: 2018-03-17 | End: 2018-03-17

## 2018-03-17 RX ORDER — ONDANSETRON 4 MG/1
4 TABLET, ORALLY DISINTEGRATING ORAL EVERY 6 HOURS PRN
Status: DISCONTINUED | OUTPATIENT
Start: 2018-03-17 | End: 2018-03-17 | Stop reason: HOSPADM

## 2018-03-17 RX ORDER — HYDROCODONE BITARTRATE AND ACETAMINOPHEN 5; 325 MG/1; MG/1
1-2 TABLET ORAL EVERY 4 HOURS PRN
Status: DISCONTINUED | OUTPATIENT
Start: 2018-03-17 | End: 2018-03-17 | Stop reason: HOSPADM

## 2018-03-17 RX ORDER — PROCHLORPERAZINE MALEATE 5 MG
5 TABLET ORAL EVERY 6 HOURS PRN
Status: DISCONTINUED | OUTPATIENT
Start: 2018-03-17 | End: 2018-03-17 | Stop reason: HOSPADM

## 2018-03-17 RX ORDER — NALOXONE HYDROCHLORIDE 0.4 MG/ML
.1-.4 INJECTION, SOLUTION INTRAMUSCULAR; INTRAVENOUS; SUBCUTANEOUS
Status: DISCONTINUED | OUTPATIENT
Start: 2018-03-17 | End: 2018-03-17 | Stop reason: HOSPADM

## 2018-03-17 RX ORDER — NALOXONE HYDROCHLORIDE 0.4 MG/ML
.1-.4 INJECTION, SOLUTION INTRAMUSCULAR; INTRAVENOUS; SUBCUTANEOUS
Status: DISCONTINUED | OUTPATIENT
Start: 2018-03-17 | End: 2018-03-17

## 2018-03-17 RX ORDER — SODIUM CHLORIDE 9 MG/ML
INJECTION, SOLUTION INTRAVENOUS CONTINUOUS
Status: DISCONTINUED | OUTPATIENT
Start: 2018-03-17 | End: 2018-03-17

## 2018-03-17 RX ORDER — HYDROMORPHONE HYDROCHLORIDE 1 MG/ML
0.2 INJECTION, SOLUTION INTRAMUSCULAR; INTRAVENOUS; SUBCUTANEOUS
Status: DISCONTINUED | OUTPATIENT
Start: 2018-03-17 | End: 2018-03-17 | Stop reason: HOSPADM

## 2018-03-17 RX ORDER — AMOXICILLIN 250 MG
1-2 CAPSULE ORAL 2 TIMES DAILY
Qty: 14 TABLET | Refills: 0 | Status: SHIPPED | OUTPATIENT
Start: 2018-03-17 | End: 2018-10-23

## 2018-03-17 RX ORDER — HYDROCODONE BITARTRATE AND ACETAMINOPHEN 5; 325 MG/1; MG/1
1 TABLET ORAL
Status: DISCONTINUED | OUTPATIENT
Start: 2018-03-17 | End: 2018-03-17 | Stop reason: HOSPADM

## 2018-03-17 RX ORDER — ONDANSETRON 2 MG/ML
4 INJECTION INTRAMUSCULAR; INTRAVENOUS EVERY 6 HOURS PRN
Status: DISCONTINUED | OUTPATIENT
Start: 2018-03-17 | End: 2018-03-17 | Stop reason: HOSPADM

## 2018-03-17 RX ORDER — PROCHLORPERAZINE MALEATE 5 MG
10 TABLET ORAL EVERY 6 HOURS PRN
Status: DISCONTINUED | OUTPATIENT
Start: 2018-03-17 | End: 2018-03-17

## 2018-03-17 RX ADMIN — ONDANSETRON 4 MG: 2 INJECTION INTRAMUSCULAR; INTRAVENOUS at 00:38

## 2018-03-17 RX ADMIN — HYDROCODONE BITARTRATE AND ACETAMINOPHEN 1 TABLET: 5; 325 TABLET ORAL at 08:33

## 2018-03-17 NOTE — PLAN OF CARE
Problem: Patient Care Overview  Goal: Plan of Care/Patient Progress Review  Outcome: Improving  A&Ox4. VSS on RA. Up SBA. Voiding. IV SL locked. C/O nausea on arrival to unit-PRN IV zofran given x1 with relief. C/O abdominal pain with movement this AM-would like to take oral pain medication with breakfast this morning. BS +. 3 lap sites CDI-covered with bandaids.  CMS intact. LS clear. Plan to d/c this AM.

## 2018-03-17 NOTE — ANESTHESIA POSTPROCEDURE EVALUATION
Patient: Yvonne Sesay    Procedure(s):  LAPAROSCOPIC APPENDECTOMY - Wound Class: III-Contaminated    Diagnosis:unknown  Diagnosis Additional Information: No value filed.    Anesthesia Type:  General, RSI, ETT    Note:  Anesthesia Post Evaluation    Patient location during evaluation: PACU  Patient participation: Able to fully participate in evaluation  Level of consciousness: awake  Pain management: adequate  Airway patency: patent  Cardiovascular status: acceptable  Respiratory status: acceptable  Hydration status: acceptable  PONV: none     Anesthetic complications: None          Last vitals:  Vitals:    03/17/18 0043 03/17/18 0115 03/17/18 0350   BP: 115/74 110/82 103/59   Pulse:      Resp: 16 16 14   Temp: 36.1  C (97  F)  (P) 36.1  C (97  F)   SpO2:  94% 94%         Electronically Signed By: Yvonne Waldron MD, MD  March 17, 2018  6:58 AM

## 2018-03-17 NOTE — ANESTHESIA CARE TRANSFER NOTE
Patient: Yvonne Sesay    Procedure(s):  LAPAROSCOPIC APPENDECTOMY - Wound Class: III-Contaminated    Diagnosis: unknown  Diagnosis Additional Information: No value filed.    Anesthesia Type:   General, RSI, ETT     Note:  Airway :Face Mask  Patient transferred to:PACU  Comments: Level of Conscious:Asleep  Vital Signs   BP:108/60   HR:85   RR:21   O2 Saturation:100   Oxygen LPM:8   Temp:37.1  Dentition:Unchanged from preop  Patient Status:Stable  Report to PACU RN.Handoff Report: Identifed the Patient, Identified the Reponsible Provider, Reviewed the pertinent medical history, Discussed the surgical course, Reviewed Intra-OP anesthesia mangement and issues during anesthesia, Set expectations for post-procedure period and Allowed opportunity for questions and acknowledgement of understanding      Vitals: (Last set prior to Anesthesia Care Transfer)    CRNA VITALS  3/16/2018 2231 - 3/16/2018 2307      3/16/2018             NIBP: 119/75    Pulse: 95    NIBP Mean: 87    SpO2: 98 %    Resp Rate (observed): 9                Electronically Signed By: Christine Marie Volp Hodgkins, CRNA, APRN CRNA  March 16, 2018  11:07 PM

## 2018-03-17 NOTE — OP NOTE
PREOPERATIVE DIAGNOSIS: Acute appendicitis.      POSTOPERATIVE DIAGNOSIS: Acute appendicitis.      PROCEDURE: Laparoscopic appendectomy.      SURGEON: Jammie Greenwood MD     ASSISTANT:  NONE    ANESTHESIA: GET.     COMPLICATIONS: None.     BLOOD LOSS: Minimal.     FINDINGS: Acute appendicitis, no perforation, retroperitoneal appendix.     INDICATIONS:Yvonne Sesay is a 37 year old  with two day history of abdominal pain. An abdominal CT was performed which showed acute appendicitis. I explained the risks, benefits, complications including but not limited to bleeding, infection, possible need to open, possible postop hematoma, seroma, bowel or bladder injury, all which require additional procedures. The patient did agree and did sign consent.       DETAILS OF PROCEDURE: The patient was brought to the operating room per anesthesia, placed in supine position, intubated without difficulty. She was given perioperative antibiotics.  The patient was prepped and draped in the usual fashion using ChloraPrep and the surgical timeout was then performed, verifying the correct surgeon, site, procedure and patient. All in the room were in agreement.     A 5mm 0 degree laparoscope was inserted and the visiport used to obtain entrance to the abdomen. Insufflation was connected and the abdomen insufflated. Initial evaluation of the abdomen revealed no inflammation in the right lower quadrant and revealed no trocar injuries. The abdomen was insufflated with pressure of 15, which the patient tolerated well, a 2nd 5mm port was placed suprapubically and a 12mm port placed infraumbilically under direct visualization. The appendix was found and appeared to travel into the retroperitoneum in the right lower quadrant. The cecum and ascending colon were also partially retroperitonealized. There was a sheath of peritoneum enveloping the distal aspect of the appendix. The ligasure was used to divide the overlying peritoneum and exposed  the appendix. The appendix was thickened and enlarged at the distal aspect. The base of the cecum was not  thickened. The terminal ileum was normal. There was no evidence of perforation. The ligasure was used to divide the mesoappendix.  The appendix was then divided from the cecum at the base using a tan load 45mm endo LYNETTE stapler, two firings were required to complete the transection and this included a portion of the cecum. The staple line was inspected and found to be hemostatic.  The appendix was placed an EndoCatch bag and removed through the umbilical trocar. The area was explored. Staple lines were completely intact. There was no bleeding. The right lower quadrant was irrigated with saline and suctioned. The pelvis showed no acute findings. All trocars were taken out under direct visualization. The fascia was closed with an 0 vicryl sutures using the familia rossy device in a figure of eight fashion.     Marcaine 0.5% injected to all the wounds. They were irrigated and closed with 4-0 Monocryl in subcuticular fashion.Steri strips and bandaids were applied. The patient tolerated the procedure well and was awoken from anesthesia and transferred to PACU in stable condition. All sponge, instrument, and needle counts were correct at the conclusion of the procedure.      Jammie Greenwood MD  [facility]  [phone]

## 2018-03-17 NOTE — PLAN OF CARE
Problem: Patient Care Overview  Goal: Plan of Care/Patient Progress Review  Outcome: Adequate for Discharge Date Met: 03/17/18  Patient discharged to home  by wheelchair at 10.30 AM 03/17/18.  Medication regimen and new medications discussed with patient and patient verbalizes understanding. Diet and activity and wound care discussed with patient. Upcoming appointments reviewed. No questions at this time. Patient escorted to Door#6 along with her family/friend Yin.

## 2018-03-17 NOTE — ANESTHESIA PREPROCEDURE EVALUATION
Anesthesia Evaluation     . Pt has had prior anesthetic.     No history of anesthetic complications          ROS/MED HX    ENT/Pulmonary:      (-) sleep apnea   Neurologic:     (+)other neuro Reversible posterior leukencephalopathy syndrome    Cardiovascular:         METS/Exercise Tolerance:     Hematologic:         Musculoskeletal:         GI/Hepatic:     (+) appendicitis,      (-) GERD   Renal/Genitourinary:         Endo:         Psychiatric:         Infectious Disease:         Malignancy:         Other: Comment: MTHR mutation                                   Anesthesia Plan      History & Physical Review  History and physical reviewed and following examination; no interval change.    ASA Status:  2 emergent.    NPO Status:  > 8 hours    Plan for General, RSI and ETT with Intravenous induction. Maintenance will be Balanced.    PONV prophylaxis:  Ondansetron (or other 5HT-3) and Dexamethasone or Solumedrol (Propofol gtt)  No Nitrous oxide      Postoperative Care  Postoperative pain management:  IV analgesics and Oral pain medications.      Consents  Anesthetic plan, risks, benefits and alternatives discussed with:  Patient..                        Procedure: Procedure(s):  LAPAROSCOPIC APPENDECTOMY  Preop diagnosis: unknown    No Known Allergies  Past Medical History:   Diagnosis Date     MTHFR mutation (methylenetetrahydrofolate reductase) (H)      RPLS (reversible posterior leukoencephalopathy syndrome)      Past Surgical History:   Procedure Laterality Date     ------------OTHER-------------      mole removal     teeth extraction[       Social History   Substance Use Topics     Smoking status: Current Every Day Smoker     Packs/day: 0.50     Types: Cigarettes     Smokeless tobacco: Never Used      Comment: would like information in regards to smoking cessation post delivery.     Alcohol use 0.0 oz/week      Comment: occ     Prior to Admission medications    Medication Sig Start Date End Date Taking?  Authorizing Provider   HYDROcodone-acetaminophen (NORCO) 5-325 MG per tablet Take 1-2 tablets by mouth every 4 hours as needed for other (Moderate to Severe Pain) 3/16/18  Yes Ramesh Duke PA-C     Current Facility-Administered Medications Ordered in Epic   Medication Dose Route Frequency Last Rate Last Dose     Provider ordered ALTERNATE pre op antibiotic.  1 each As instructed Continuous         lactated ringers infusion   Intravenous Continuous 25 mL/hr at 03/16/18 2058       fentaNYL (PF) (SUBLIMAZE) injection 50 mcg  50 mcg Intravenous Q1H PRN   50 mcg at 03/16/18 2057     ROPivacaine (NAROPIN) injection    PRN   10 mL at 11/12/14 1413     fentaNYL (SUBLIMAZE) injection   EPIDURAL PRN   100 mcg at 11/12/14 1414     Current Outpatient Prescriptions Ordered in Epic   Medication     HYDROcodone-acetaminophen (NORCO) 5-325 MG per tablet       Another Antibiotic has been ordered.       lactated ringers 25 mL/hr at 03/16/18 2058     Wt Readings from Last 1 Encounters:   03/16/18 61 kg (134 lb 8 oz)     Temp Readings from Last 1 Encounters:   03/16/18 36  C (96.8  F) (Oral)     BP Readings from Last 6 Encounters:   03/16/18 122/77   11/13/14 122/77   03/16/14 110/60   02/11/14 114/66   10/28/13 120/78   10/10/12 140/93     Pulse Readings from Last 4 Encounters:   03/16/18 99   11/12/14 79   03/16/14 105   02/11/14 86     Resp Readings from Last 1 Encounters:   03/16/18 16     SpO2 Readings from Last 1 Encounters:   03/16/18 98%     Recent Labs   Lab Test  03/16/18   1259   NA  138   POTASSIUM  4.2   CHLORIDE  107   CO2  26   ANIONGAP  5   GLC  95   BUN  11   CR  0.74   ABDIRAHMAN  8.4*     No results for input(s): AST, ALT, ALKPHOS, BILITOTAL, LIPASE in the last 30432 hours.  Recent Labs   Lab Test  03/16/18   1259  11/12/14   0930   WBC  14.2*  12.3*   HGB  13.6  12.8   PLT  192  161     Recent Labs   Lab Test 04/16/14   ABO  A   RH  Pos     Recent Labs   Lab Test  10/08/12   0824  07/01/11   0525   INR  0.95  0.96    PTT  31  30      No results for input(s): TROPI in the last 55900 hours.  No results for input(s): PH, PCO2, PO2, HCO3 in the last 92832 hours.  No results for input(s): HCG in the last 98581 hours.  Recent Results (from the past 744 hour(s))   CT Abdomen Pelvis w Contrast    Narrative    CT ABDOMEN AND PELVIS WITH CONTRAST   3/16/2018 2:26 PM     HISTORY: Right lower quadrant pain.    COMPARISON: None.    TECHNIQUE: Following the uneventful administration of  68 mL  Isovue-370 intravenous contrast, helical sections were acquired from  the top of the diaphragm through the pubic symphysis. Coronal  reconstructions were generated. Radiation dose for this scan was  reduced using automated exposure control, adjustment of the mA and/or  kV according to the patient's size, or iterative reconstruction  technique.    FINDINGS:     ABDOMEN: The liver, spleen, pancreas, adrenal glands and kidneys are  unremarkable. The gallbladder is present. No enlarged lymph nodes or  free fluid in the upper abdomen.    Scan through the lower chest is unremarkable.    Pelvis: The small and large bowel are normal in caliber. The appendix  is mildly thick-walled and mildly dilated, measuring up to 1.0 cm in  thickness (series 2 image 51). Slight haziness is present in the  periappendiceal fat. These findings are suggestive of acute  appendicitis. No extraluminal gas or loculated fluid collections in  the pelvis. The uterus is present. No enlarged lymph nodes or free  fluid in the pelvis.      Impression    IMPRESSION: Probable early acute appendicitis. No evidence of rupture.    The findings were called to Dr. Navarrete by Dr. Wu on 3/16/2018 at  1441 hours.       RECENT LABS:   ECG:   ECHO:

## 2018-03-20 LAB — COPATH REPORT: NORMAL

## 2018-03-29 ENCOUNTER — TELEPHONE (OUTPATIENT)
Dept: SURGERY | Facility: CLINIC | Age: 38
End: 2018-03-29

## 2018-03-29 NOTE — TELEPHONE ENCOUNTER
Patient Name: Yvonne Sesay  Today's Date: 03/29/18    Patient was called earlier today by Tita Farah PA-C for an update regarding her recovery. She called back and left a message with our staff requesting a call back and stating that we could leave a voicemail for her with detailed medical info. I called her and there was no answer. I left a message stating that we were calling to see how she was doing postoperatively and also included her pathology results (see below). I encouraged her to call our office back.    SPECIMEN(S):   Appendix     FINAL DIAGNOSIS:   Appendix, appendectomy: Acute appendicitis with periappendicitis.       Mariajose Rodrigues PA-C  [facility]  [phone]

## 2018-03-29 NOTE — TELEPHONE ENCOUNTER
Name of caller: Patient    Reason for Call:  Post op question    Surgeon:  Dr. Greenwood    Recent Surgery:  No    If yes, when & what type:  Laparoscopic appendectomy.   3/16      Best phone number to reach pt at is: [phone]  Ok to leave a message with medical info? Yes.    Pharmacy preferred (if calling for a refill): no

## 2018-03-29 NOTE — TELEPHONE ENCOUNTER
[facility]  Post op call note - No Answer    Yvonne Sesay was called for an update regarding her recovery.  There was no answer and a message was left requesting a return call.    Tita Farah PA-C

## 2018-10-23 ENCOUNTER — OFFICE VISIT (OUTPATIENT)
Dept: INTERNAL MEDICINE | Facility: CLINIC | Age: 38
End: 2018-10-23
Payer: COMMERCIAL

## 2018-10-23 VITALS
DIASTOLIC BLOOD PRESSURE: 70 MMHG | BODY MASS INDEX: 21.85 KG/M2 | SYSTOLIC BLOOD PRESSURE: 115 MMHG | RESPIRATION RATE: 16 BRPM | HEART RATE: 111 BPM | TEMPERATURE: 98.6 F | WEIGHT: 135.4 LBS | OXYGEN SATURATION: 98 %

## 2018-10-23 DIAGNOSIS — R07.0 THROAT PAIN: ICD-10-CM

## 2018-10-23 DIAGNOSIS — J02.0 ACUTE STREPTOCOCCAL PHARYNGITIS: Primary | ICD-10-CM

## 2018-10-23 LAB
DEPRECATED S PYO AG THROAT QL EIA: ABNORMAL
SPECIMEN SOURCE: ABNORMAL

## 2018-10-23 PROCEDURE — 87880 STREP A ASSAY W/OPTIC: CPT | Performed by: PHYSICIAN ASSISTANT

## 2018-10-23 PROCEDURE — 99213 OFFICE O/P EST LOW 20 MIN: CPT | Performed by: PHYSICIAN ASSISTANT

## 2018-10-23 RX ORDER — AMOXICILLIN 500 MG/1
500 CAPSULE ORAL 3 TIMES DAILY
Qty: 30 CAPSULE | Refills: 0 | Status: SHIPPED | OUTPATIENT
Start: 2018-10-23 | End: 2018-11-02

## 2018-10-23 NOTE — MR AVS SNAPSHOT
After Visit Summary   10/23/2018    Yvonne Sesay    MRN: [id number]           Patient Information     Date Of Birth          1980        Visit Information        Provider Department      10/23/2018 8:40 AM Karen Mitchell PA-C [facility]        Today's Diagnoses     Acute streptococcal pharyngitis    -  1    Throat pain           Follow-ups after your visit        Follow-up notes from your care team     Return in about 2 weeks (around 11/6/2018), or if symptoms worsen or fail to improve.      Who to contact     If you have questions or need follow up information about today's clinic visit or your schedule please contact [facility] directly at [phone].  Normal or non-critical lab and imaging results will be communicated to you by [email or web address], letter or phone within 4 business days after the clinic has received the results. If you do not hear from us within 7 days, please contact the clinic through [email or web address] or phone. If you have a critical or abnormal lab result, we will notify you by phone as soon as possible.  Submit refill requests through [employer] or call your pharmacy and they will forward the refill request to us. Please allow 3 business days for your refill to be completed.          Additional Information About Your Visit        MyChart Information     [employer] gives you secure access to your electronic health record. If you see a primary care provider, you can also send messages to your care team and make appointments. If you have questions, please call your primary care clinic.  If you do not have a primary care provider, please call [phone] and they will assist you.        Care EveryWhere ID     This is your Care EveryWhere ID. This could be used by other organizations to access your [place] medical records  [id number]        Your Vitals Were     Pulse Temperature Respirations Pulse Oximetry BMI (Body Mass Index)        111 98.6  F (37  C) (Oral) 16 98% 21.85 kg/m2        Blood Pressure from Last 3 Encounters:   10/23/18 115/70   03/17/18 121/71   11/13/14 122/77    Weight from Last 3 Encounters:   10/23/18 135 lb 6.4 oz (61.4 kg)   03/16/18 134 lb 8 oz (61 kg)   11/12/14 185 lb (83.9 kg)              We Performed the Following     Rapid strep screen          Today's Medication Changes          These changes are accurate as of 10/23/18  9:04 AM.  If you have any questions, ask your nurse or doctor.               Start taking these medicines.        Dose/Directions    amoxicillin 500 MG capsule   Commonly known as:  AMOXIL   Used for:  Acute streptococcal pharyngitis   Started by:  Karen Mitchell PA-C        Dose:  500 mg   Take 1 capsule (500 mg) by mouth 3 times daily for 10 days   Quantity:  30 capsule   Refills:  0         Stop taking these medicines if you haven't already. Please contact your care team if you have questions.     HYDROcodone-acetaminophen 5-325 MG per tablet   Commonly known as:  NORCO   Stopped by:  Karen Mitchell PA-C           senna-docusate 8.6-50 MG per tablet   Commonly known as:  SENOKOT-S;PERICOLACE   Stopped by:  Karen Mitchell PA-C                Where to get your medicines      These medications were sent to [facility] [address] [place] [address] [address] AT [facility] [address]  [facility] [address], [facility] [address]     Phone:  [phone]     amoxicillin 500 MG capsule                Primary Care Provider Office Phone # Fax #    Claudio Menon [phone]       [address] [address]        Equal Access to Services     YO COLON AH: [place], wajasonda luqadaha, qaybta kaalmada lori, demar griffin. So [facility] [phone].    ATENCIÓN: Si habla español, tiene a jo disposición servicios gratuitos de asistencia lingüística. Llame al [phone].    We comply with  applicable federal civil rights laws and [place] laws. We do not discriminate on the basis of race, color, national origin, age, disability, sex, sexual orientation, or gender identity.            Thank you!     Thank you for choosing [facility]  for your care. Our goal is always to provide you with excellent care. Hearing back from our patients is one way we can continue to improve our services. Please take a few minutes to complete the written survey that you may receive in the mail after your visit with us. Thank you!             Your Updated Medication List - Protect others around you: Learn how to safely use, store and throw away your medicines at www.disposemymeds.org.          This list is accurate as of 10/23/18  9:04 AM.  Always use your most recent med list.                   Brand Name Dispense Instructions for use Diagnosis    amoxicillin 500 MG capsule    AMOXIL    30 capsule    Take 1 capsule (500 mg) by mouth 3 times daily for 10 days    Acute streptococcal pharyngitis

## 2018-10-23 NOTE — PROGRESS NOTES
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  Additional history: as documented    Labs reviewed in EPIC    Reviewed and updated as needed this visit by clinical staff       Reviewed and updated as needed this visit by Provider         ROS:  Constitutional, HEENT, cardiovascular, pulmonary, gi and gu systems are negative, except as otherwise noted.    OBJECTIVE:     /70  Pulse 111  Temp 98.6  F (37  C) (Oral)  Resp 16  Wt 135 lb 6.4 oz (61.4 kg)  SpO2 98%  BMI 21.85 kg/m2  Body mass index is 21.85 kg/(m^2).  GENERAL: healthy, alert and no distress  HENT: normal cephalic/atraumatic, ear canals and TM's normal, nose and mouth without ulcers or lesions, oral mucous membranes moist, tonsillar hypertrophy, tonsillar erythema and tonsillar exudate  NECK: bilateral anterior cervical adenopathy, no asymmetry, masses, or scars and thyroid normal to palpation  RESP: lungs clear to auscultation - no rales, rhonchi or wheezes  CV: regular rates and rhythm and normal S1 S2, no S3 or S4  MS: no gross musculoskeletal defects noted, no edema  SKIN: no suspicious lesions or rashes    Diagnostic Test Results:  Results for orders placed or performed in visit on 10/23/18 (from the past 24 hour(s))   Rapid strep screen   Result Value Ref Range    Specimen Description Throat     Rapid Strep A Screen (A)      POSITIVE: Group A Streptococcal antigen detected by immunoassay.       ASSESSMENT/PLAN:             1. Acute streptococcal  pharyngitis    - amoxicillin (AMOXIL) 500 MG capsule; Take 1 capsule (500 mg) by mouth 3 times daily for 10 days  Dispense: 30 capsule; Refill: 0    2. Throat pain    - Rapid strep screen    Fluids rest  Gargles  NSAID  Home from work 24 hours on antibiotics  Recheck prn not improving 2 weeks     Karen Mitchell PA-C  [facility]

## 2018-11-28 ENCOUNTER — OFFICE VISIT (OUTPATIENT)
Dept: INTERNAL MEDICINE | Facility: CLINIC | Age: 38
End: 2018-11-28
Payer: COMMERCIAL

## 2018-11-28 VITALS
HEART RATE: 98 BPM | WEIGHT: 135 LBS | OXYGEN SATURATION: 99 % | TEMPERATURE: 98.2 F | SYSTOLIC BLOOD PRESSURE: 116 MMHG | BODY MASS INDEX: 21.79 KG/M2 | DIASTOLIC BLOOD PRESSURE: 82 MMHG

## 2018-11-28 DIAGNOSIS — Z11.4 SCREENING FOR HIV (HUMAN IMMUNODEFICIENCY VIRUS): ICD-10-CM

## 2018-11-28 DIAGNOSIS — Z00.00 ENCOUNTER FOR ROUTINE ADULT HEALTH EXAMINATION WITHOUT ABNORMAL FINDINGS: Primary | ICD-10-CM

## 2018-11-28 DIAGNOSIS — Z13.29 SCREENING FOR THYROID DISORDER: ICD-10-CM

## 2018-11-28 DIAGNOSIS — Z13.6 CARDIOVASCULAR SCREENING; LDL GOAL LESS THAN 130: ICD-10-CM

## 2018-11-28 DIAGNOSIS — Z23 NEED FOR PROPHYLACTIC VACCINATION AND INOCULATION AGAINST INFLUENZA: ICD-10-CM

## 2018-11-28 LAB
ALBUMIN SERPL-MCNC: 4 G/DL (ref 3.4–5)
ALP SERPL-CCNC: 49 U/L (ref 40–150)
ALT SERPL W P-5'-P-CCNC: 34 U/L (ref 0–50)
ANION GAP SERPL CALCULATED.3IONS-SCNC: 9 MMOL/L (ref 3–14)
AST SERPL W P-5'-P-CCNC: 30 U/L (ref 0–45)
BILIRUB SERPL-MCNC: 0.3 MG/DL (ref 0.2–1.3)
BUN SERPL-MCNC: 17 MG/DL (ref 7–30)
CALCIUM SERPL-MCNC: 9.1 MG/DL (ref 8.5–10.1)
CHLORIDE SERPL-SCNC: 104 MMOL/L (ref 94–109)
CHOLEST SERPL-MCNC: 146 MG/DL
CO2 SERPL-SCNC: 24 MMOL/L (ref 20–32)
CREAT SERPL-MCNC: 0.81 MG/DL (ref 0.52–1.04)
ERYTHROCYTE [DISTWIDTH] IN BLOOD BY AUTOMATED COUNT: 12.4 % (ref 10–15)
GFR SERPL CREATININE-BSD FRML MDRD: 80 ML/MIN/1.7M2
GLUCOSE SERPL-MCNC: 90 MG/DL (ref 70–99)
HCT VFR BLD AUTO: 45.1 % (ref 35–47)
HDLC SERPL-MCNC: 44 MG/DL
HGB BLD-MCNC: 15.5 G/DL (ref 11.7–15.7)
LDLC SERPL CALC-MCNC: 84 MG/DL
MCH RBC QN AUTO: 29.9 PG (ref 26.5–33)
MCHC RBC AUTO-ENTMCNC: 34.4 G/DL (ref 31.5–36.5)
MCV RBC AUTO: 87 FL (ref 78–100)
NONHDLC SERPL-MCNC: 102 MG/DL
PLATELET # BLD AUTO: 242 10E9/L (ref 150–450)
POTASSIUM SERPL-SCNC: 3.5 MMOL/L (ref 3.4–5.3)
PROT SERPL-MCNC: 7.7 G/DL (ref 6.8–8.8)
RBC # BLD AUTO: 5.19 10E12/L (ref 3.8–5.2)
SODIUM SERPL-SCNC: 137 MMOL/L (ref 133–144)
TRIGL SERPL-MCNC: 92 MG/DL
TSH SERPL DL<=0.005 MIU/L-ACNC: 1.09 MU/L (ref 0.4–4)
WBC # BLD AUTO: 7.8 10E9/L (ref 4–11)

## 2018-11-28 PROCEDURE — 80061 LIPID PANEL: CPT | Performed by: INTERNAL MEDICINE

## 2018-11-28 PROCEDURE — 99395 PREV VISIT EST AGE 18-39: CPT | Performed by: INTERNAL MEDICINE

## 2018-11-28 PROCEDURE — 85027 COMPLETE CBC AUTOMATED: CPT | Performed by: INTERNAL MEDICINE

## 2018-11-28 PROCEDURE — 80053 COMPREHEN METABOLIC PANEL: CPT | Performed by: INTERNAL MEDICINE

## 2018-11-28 PROCEDURE — 36415 COLL VENOUS BLD VENIPUNCTURE: CPT | Performed by: INTERNAL MEDICINE

## 2018-11-28 PROCEDURE — 84443 ASSAY THYROID STIM HORMONE: CPT | Performed by: INTERNAL MEDICINE

## 2018-11-28 NOTE — PATIENT INSTRUCTIONS
"  Preventive Health Recommendations  Female Ages 26 - 39  Yearly exam:   See your health care provider every year in order to    Review health changes.     Discuss preventive care.      Review your medicines if you your doctor has prescribed any.    Until age 30: Get a Pap test every three years (more often if you have had an abnormal result).    After age 30: Talk to your doctor about whether you should have a Pap test every 3 years or have a Pap test with HPV screening every 5 years.   You do not need a Pap test if your uterus was removed (hysterectomy) and you have not had cancer.  You should be tested each year for STDs (sexually transmitted diseases), if you're at risk.   Talk to your provider about how often to have your cholesterol checked.  If you are at risk for diabetes, you should have a diabetes test (fasting glucose).  Shots: Get a flu shot each year. Get a tetanus shot every 10 years.   Nutrition:     Eat at least 5 servings of fruits and vegetables each day.    Eat whole-grain bread, whole-wheat pasta and brown rice instead of white grains and rice.    Get adequate Calcium and Vitamin D.        --Good Grains:  Oats, brown rice, Quinoa (these do not raise the blood sugar as much)     --Bad grains:  Anything made from wheat or white rice     (because these raise the blood sugars significantly, and the possible gluten issue from wheat for some people).      --Proteins:  Aim for \"lean proteins\" including chicken, fish, seafood, pork, turkey, and eggs (in moderation); Eat red meat only occasionally          Bertram Turner:                    Lifestyle    Exercise at least 150 minutes a week (30 minutes a day, 5 days of the week). This will help you control your weight and prevent disease.    Limit alcohol to one drink per day.    No smoking.     Wear sunscreen to prevent skin cancer.    See your dentist every six months for an exam and cleaning.      SMOKING " CESSATION:  ===========================================    *  Consider visiting www.Quitplan.Lvmae for options for support in quitting smoking.     * Set a quit date when you will no longer smoke.    *  Get something for your hands to do when you are relaxing.  Examples may be a rubber band around your wrist, pen to click, poker chip, silver dollar, or  strength ball.  This will keep your hands occupied when you would have normally been holding a cigarette.    *  Have something to put in your mouth ( preferably something healthy).  The oral fixation in smokers can be a difficult thing to get over.  Use gum, [place]hers, Dum Dum suckers, carrots, celery sticks.  try to avoid the urge to snack or est junk food.  This will help prevent the weight gain that many people who quit smoking can experience.    2 medication options for quitting smoking aids:    ------------------------------------------------------------------    Option #1:  Consider Chantix.  Visit Chantix web site (www.[employer].com) for more details about the medicatino and how it works, and even for coupons.  The nicotine withdrawal is managed via the mechanism by which Chantix works, you do not need nicotine replacement while taking this.   The main side effects include weird or strange dreams, stomach upset, and potential adverse changes in the mood, including worsening of depression or anxiety.  There have even been reports of suicides caused by the worsening in the depression.  If you take Chantix and develop any adverse changes in your mood or become more depressed or more anxious, then you should stop the medicatioon immediately and contact us.  Any side effects from Chantix usually resolve very quickly.      *  If you start Chantix, I will prescribe the first month, then ask that you contact me with an update after the first month, regardless of how you feel to see how the medication is working for you and to make sure no side effects.  If the  "medication is working well and there are no side effects, then I will continue the medication.     Option #2:   *  Start Nicotine replacement with either gum or lozenges starting the first day of not smoking.  Slowly decrease the amount of nicotine replacement over a few weeks until you no longer need it.  your body will adjust gradually to requiring no nicotine at all.  The first 1-2 weeks are the toughest as your body gets used to not having nicotine around.    *  Start Zyban (or Buproprion/Wellbutrin) 150 mg once daily first thing in the morning 1 week before your quit date.  If no side effects, then add second dose of 150 mg late in the afternoon or early evening.    *Potential side effects including but not limited to seizure (1 out of 1000 patients on Zyban may experience a seizure), GI upset, insomnia, headache, weight loss.  If your experience side effects while ion Zyban/Wellbutrin, then call [phone] ([facility] Internal Medicine Nurse Line) and let me know.  You only need to stay on the zyban for about 2-3 months before you can stop it.  When you have decided to longer take Zyban/Wellbutrin, then go down to 1 tablet per day again for 1 week, then stop completely.     *  OBESITY/WEIGHT LOSS:  ====================================================    *  Obesity is a major problem in this country.  Over 2/3 of adult Americans are overweight (BMI Over 25), and 1/3 are obese (BMI over 30)    *  Obesity is the leading cause of diabetes type II, which currently affects 1 out of 10 adult Americans and is projected to potentially affect 1 out of 3 adult Americans by 2040    *  Chronic obesity leads to \"insulin resistance\" which affects the carbohydrate (sugar) metabolism causing \"diabetisy\" which leads to type II Diabetes, increased visceral fat, a chronic low grade inflammatory state that leads to higher rates of vascular disease among other things.     *  Obesity is defined as BMI (body mass index) greater than " "30.    *  Morbid obesity is defined as BMI over 40    Your most recent Body mass index is:  Body mass index is 21.79 kg/(m^2).    The main principles in weight loss are diet and exercise. You need to have both.      Dietary principles are aimed at keeping the fat content in your diet to less than 30% of total calories AND minimizing the simple carbohydrates (breads, sugars, pasta, etc.).  Complex carbohydrates such as wild rice, brown rice, legumes and most vegetables are OK.    *  Think \"Eat like a caveman\", eat \"primitive\" foods.    *  Keep your food shopping to the outside of the grocery store, do not eat foods that come from a bag or box, do not eat foods with bar codes.    *  Use the fork rule for all eating. [If you can't pick food up with a fork, then the food will not turn off hunger very well. Using this rule helps patients avoid choosing the wrong types of food, especially if you have had a bariatric surgery operation.   The \"wrong foods\" include liquid calories such as soup, juice, soda, slimfast, ice cream, and beer, crumbly foods such as chips, crackers, and cookies, and starch based foods such as pasta, too much rice, and too much bread.]     * Keep your calorie intake at least less than 1500 per day to start (under 1300 total if you want to be more aggressive), divided between 3 meals (try to have no meal more than 500 calories) and 2 snacks.      *  \"Learn what 500 calories looks like\" and try to keep all meals under 500 calories.      *  Drink one large glass of water BEFORE each meal.  This not only helps guard against dehydration, but it also helps provide additional \"fullness\" that will help reduce the amount of food that you will consume in a given meal by helping you fill up earlier.      *  Figure out what kind of calories you are taking in now at this time.  It is hard to change behaviors that you do not understand.      *  Eat breakfast every day.  Skipping meals has been shown to be one of " "the factors that correlates the highest with obesity, (even more than fast food).  Try to avoid the typical carbohydrates and sugars that dominate the typical American breakfast.  Try to get more protein in earlier in the day, this will make you less hungry later.       *  Try High Protein Ensure or Boost nutritional supplements (or similar versions) for breakfast if nothing else.      *  Avoid \"manufactured foods\" as much as possible.  My definition of a \"manufactured\" or \"processed\" food is any single food product that has more than 6 ingredients.     *  Keep your grocery shopping to the \"outside\" of the grocery store, this is where all the \"real food\" is located.      *  Try to limit all simple carbohydrate foods such as white breads (whole grain natural breads are OK), white rice (brown rice and wild rice are OK), pasta, noodles, \"snack foods\", candy, jam/jellies, cakes, pies, sweets, regular soda (sugar free is OK).    *  Limit the amount of citrus fruits such as oranges, clyde, grapefruit.  These contain a large amount of sugars and will raise your blood sugars very high.  Try to keep less than 3 pieces of fruit per day.  Grapefruits specifically can interfere with the metabolism of \"statin\" cholesterol lowering drugs and therefore should be avoided completely if you are on a statin medication.      *  Always eat three meals a day every day:  breakfast, lunch, and supper.    *  I do not recommend over the counter diet aids such as Metabolife or Dexatrim since they have a high risk of side effects mainly fast heart rate, insomnia, and nervousness.    *  Very hard to lose weight with diet changes alone.  You need to have regular exercise.  You should aim for either 3 hours per week total of cumulative physical aerobic activity or 10,000 steps per day of activity.  Buy a pedometer and keep track of your activity.  If your typical daily activity does not add up to 10,000 total steps, then make up the difference " "with walking or some sort of aerobic activity.          Good resources for nutrition are:    ---> \"Wheat Belly\" by Lino Issa  (a book about the many bad things processed wheat products (i.e. Bread) have caused in our country...which I believe has serious merit)       --->  \"The Paleo Diet\"  By Nataliia Levi.             --->\"In Defense of Food\"  Of \"Food Rules\" (Pk Turner) a book that goes into the causes obesity epidemic in our country    Bertram Turner:                    ---> \"Picture Perfect Weight Loss\" by Hair Delgadillo (another good book about choices)        ---> \"Eat This, Not That\" Series,  by Jeramie Parsons  (a book about food choices in the modern world)              ---> \"The Blood Sugar Solution\" by Frederick Gutierrez ( a book about obesity and insulin resistance leading to \"diabesity\")           Jaron Cleve ( a guidebook for counting calories, and knowing the components of the food that you eat)       \"The Best Life Diet\"  (a complete diet program)             Aim for a Healthy Weight Web Page at www.nhlbi.nih.gov       [place] Diet       Nikita Davies:  \"Eat More, Weigh Less\" or \"The Program for the Reversal of Heart Disease\"       [facility] web site  the food and nutrition link.       American Heart Association       American Diabetes Association (www.diabetes.org)            "

## 2018-11-28 NOTE — PROGRESS NOTES
SUBJECTIVE:   CC: Yvonne Sesay is an 38 year old woman who presents for preventive health visit.     Physical   Annual:     Getting at least 3 servings of Calcium per day:  Yes    Bi-annual eye exam:  Yes    Dental care twice a year:  Yes    Sleep apnea or symptoms of sleep apnea:  None    Diet:  Regular (no restrictions)    Frequency of exercise:  None    Taking medications regularly:  Yes    Medication side effects:  Not applicable    Additional concerns today:  Yes    PHQ-2 Total Score: 2          Would like to talk about getting sick more often about 5 times this year     Today's PHQ-2 Score:   PHQ-2 ( 1999 Pfizer) 11/28/2018   Q1: Little interest or pleasure in doing things 1   Q2: Feeling down, depressed or hopeless 1   PHQ-2 Score 2   Q1: Little interest or pleasure in doing things Several days   Q2: Feeling down, depressed or hopeless Several days   PHQ-2 Score 2       Abuse: Current or Past(Physical, Sexual or Emotional)- No  Do you feel safe in your environment? Yes    Social History   Substance Use Topics     Smoking status: Current Every Day Smoker     Packs/day: 0.50     Types: Cigarettes     Smokeless tobacco: Never Used      Comment: would like information in regards to smoking cessation post delivery.     Alcohol use 0.0 oz/week      Comment: occ     Alcohol Use 11/28/2018   If you drink alcohol do you typically have greater than 3 drinks per day OR greater than 7 drinks per week? No   No flowsheet data found.    Reviewed orders with patient.  Reviewed health maintenance and updated orders accordingly - Yes      Mammogram not appropriate for this patient based on age.    Pertinent mammograms are reviewed under the imaging tab.  History of abnormal Pap smear: NO - age 30-65 PAP every 5 years with negative HPV co-testing recommended     Reviewed and updated as needed this visit by clinical staff  Tobacco  Allergies  Meds  Surg Hx         Reviewed and updated as needed this visit by  Provider  Surg Hx          Past Medical History:  ---------------------------  Past Medical History:   Diagnosis Date     MTHFR mutation (methylenetetrahydrofolate reductase) (H)      RPLS (reversible posterior leukoencephalopathy syndrome)        Past Surgical History:  ---------------------------  Past Surgical History:   Procedure Laterality Date     ------------OTHER-------------      mole removal     LAPAROSCOPIC APPENDECTOMY N/A 3/16/2018    Procedure: LAPAROSCOPIC APPENDECTOMY;  LAPAROSCOPIC APPENDECTOMY;  Surgeon: Jammie Greenwood MD;  Location: [facility] OR     teeth extraction[         Current Medications:  ---------------------------  No current outpatient prescriptions on file.       Allergies:  -------------  No Known Allergies    Social History:  -------------------  Social History     Social History     Marital status:      Spouse name: N/A     Number of children: N/A     Years of education: N/A     Occupational History      [employer]     Social History Main Topics     Smoking status: Current Every Day Smoker     Packs/day: 0.50     Types: Cigarettes     Smokeless tobacco: Never Used      Comment: would like information in regards to smoking cessation post delivery.     Alcohol use 0.0 oz/week      Comment: occ     Drug use: Yes     Special: Marijuana     Sexual activity: Yes     Partners: Male     Birth control/ protection: None     Other Topics Concern     Not on file     Social History Narrative       Family Medical History:  ------------------------------  Family History   Problem Relation Age of Onset     Prostate Cancer Father      Lipids Father      Lipids Mother      Family History Negative Sister      Family History Negative Sister      Family History Negative Brother         Review of Systems  CONSTITUTIONAL: NEGATIVE for fever, chills, change in weight  INTEGUMENTARU/SKIN: NEGATIVE for worrisome rashes, moles or lesions  EYES:  NEGATIVE for vision changes or irritation  ENT: NEGATIVE for ear, mouth and throat problems  RESP: NEGATIVE for significant cough or SOB  BREAST: NEGATIVE for masses, tenderness or discharge  CV: NEGATIVE for chest pain, palpitations or peripheral edema  GI: NEGATIVE for nausea, abdominal pain, heartburn, or change in bowel habits  : NEGATIVE for unusual urinary or vaginal symptoms. Periods are regular.  MUSCULOSKELETAL: NEGATIVE for significant arthralgias or myalgia  NEURO: NEGATIVE for weakness, dizziness or paresthesias  PSYCHIATRIC: NEGATIVE for changes in mood or affect     OBJECTIVE:   /82  Pulse 98  Temp 98.2  F (36.8  C) (Oral)  Wt 135 lb (61.2 kg)  SpO2 99%  BMI 21.79 kg/m2  Physical Exam  GENERAL alert and no distress  EYES:  Normal sclera,conjunctiva, EOMI  HENT: oral and posterior pharynx without lesions or erythema, facies symmetric  NECK: Neck supple. No LAD, without thyroidmegaly or JVD., Carotids without bruits.  RESP: Clear to ausculation bilaterally without wheezes or crackles. Normal BS in all fields.  CV: RRR normal S1S2 without murmurs, rubs or gallops. PMI normal  LYMPH: no cervical lymph adenopathy appreciated  MS: extremities- no gross deformities of the visible extremities noted, no edema  PSYCH: Alert and oriented times 3; speech- coherent  SKIN:  No obvious significant skin lesions on visible portions of face         ASSESSMENT/PLAN:     (Z00.00) Encounter for routine adult health examination without abnormal findings  (primary encounter diagnosis)  Comment: Discussed self breast exam, schedule for mammogram.  Discussed importance of regular pelvic exams and PAP smears to check for GYN malignancies.  Discussed cardiac risk factor modification including screening for (and treating if present) cholesterol, HTN, DM, and avoiding smoking.  Recommended a low fat diet and regular aerobic activity.    Counseling:      ATP III Guidelines  ICSI Preventive Guidelines   Plan: CBC with  "platelets, Comprehensive metabolic         panel, Lipid panel reflex to direct LDL Fasting              (Z11.4) Screening for HIV (human immunodeficiency virus)  Comment:   Plan:     (Z23) Need for prophylactic vaccination and inoculation against influenza  Comment:   Plan:     (Z13.29) Screening for thyroid disorder  Comment:   Plan: TSH with free T4 reflex            (Z13.6) CARDIOVASCULAR SCREENING; LDL GOAL LESS THAN 130  Comment: Discussed cardiac disease risk factors and cardiac disease risk factor modification.   Plan: CBC with platelets, Comprehensive metabolic         panel, Lipid panel reflex to direct LDL Fasting               COUNSELING:  Reviewed preventive health counseling, as reflected in patient instructions       Regular exercise       Healthy diet/nutrition       Vision screening       Hearing screening       Contraception       Safe sex practices/STD prevention    BP Readings from Last 1 Encounters:   11/28/18 116/82     Estimated body mass index is 21.79 kg/(m^2) as calculated from the following:    Height as of 3/16/18: 5' 6\" (1.676 m).    Weight as of this encounter: 135 lb (61.2 kg).           reports that she has been smoking Cigarettes.  She has been smoking about 0.50 packs per day. She has never used smokeless tobacco.      Counseling Resources:  ATP IV Guidelines  Pooled Cohorts Equation Calculator  Breast Cancer Risk Calculator  FRAX Risk Assessment  ICSI Preventive Guidelines  Dietary Guidelines for Americans, 2010  USDA's MyPlate  ASA Prophylaxis  Lung CA Screening    Claudio Menon MD  [facility]  "

## 2018-11-28 NOTE — MR AVS SNAPSHOT
After Visit Summary   11/28/2018    Yvonne Sesay    MRN: [id number]           Patient Information     Date Of Birth          1980        Visit Information        Provider Department      11/28/2018 1:00 PM Claudio Menon MD [facility]        Today's Diagnoses     Encounter for routine adult health examination without abnormal findings    -  1    Screening for malignant neoplasm of cervix        Screening for HIV (human immunodeficiency virus)        Need for prophylactic vaccination and inoculation against influenza        Screening for thyroid disorder        CARDIOVASCULAR SCREENING; LDL GOAL LESS THAN 130          Care Instructions      Preventive Health Recommendations  Female Ages 26 - 39  Yearly exam:   See your health care provider every year in order to    Review health changes.     Discuss preventive care.      Review your medicines if you your doctor has prescribed any.    Until age 30: Get a Pap test every three years (more often if you have had an abnormal result).    After age 30: Talk to your doctor about whether you should have a Pap test every 3 years or have a Pap test with HPV screening every 5 years.   You do not need a Pap test if your uterus was removed (hysterectomy) and you have not had cancer.  You should be tested each year for STDs (sexually transmitted diseases), if you're at risk.   Talk to your provider about how often to have your cholesterol checked.  If you are at risk for diabetes, you should have a diabetes test (fasting glucose).  Shots: Get a flu shot each year. Get a tetanus shot every 10 years.   Nutrition:     Eat at least 5 servings of fruits and vegetables each day.    Eat whole-grain bread, whole-wheat pasta and brown rice instead of white grains and rice.    Get adequate Calcium and Vitamin D.        --Good Grains:  Oats, brown rice, Quinoa (these do not raise the blood sugar as much)     --Bad grains:  Anything  "made from wheat or white rice     (because these raise the blood sugars significantly, and the possible gluten issue from wheat for some people).      --Proteins:  Aim for \"lean proteins\" including chicken, fish, seafood, pork, turkey, and eggs (in moderation); Eat red meat only occasionally          Bertram Turner:                    Lifestyle    Exercise at least 150 minutes a week (30 minutes a day, 5 days of the week). This will help you control your weight and prevent disease.    Limit alcohol to one drink per day.    No smoking.     Wear sunscreen to prevent skin cancer.    See your dentist every six months for an exam and cleaning.      SMOKING CESSATION:  ===========================================    *  Consider visiting www.Quitplan.Mercora for options for support in quitting smoking.     * Set a quit date when you will no longer smoke.    *  Get something for your hands to do when you are relaxing.  Examples may be a rubber band around your wrist, pen to click, poker chip, silver dollar, or  strength ball.  This will keep your hands occupied when you would have normally been holding a cigarette.    *  Have something to put in your mouth ( preferably something healthy).  The oral fixation in smokers can be a difficult thing to get over.  Use gum, Portal Ranchers, Dum Dum suckers, carrots, celery sticks.  try to avoid the urge to snack or est junk food.  This will help prevent the weight gain that many people who quit smoking can experience.    2 medication options for quitting smoking aids:    ------------------------------------------------------------------    Option #1:  Consider Chantix.  Visit [employer] web site (www.DocuSpeak.com) for more details about the medicatino and how it works, and even for coupons.  The nicotine withdrawal is managed via the mechanism by which Chantix works, you do not need nicotine replacement while taking this.   The main side effects include weird or strange dreams, stomach " upset, and potential adverse changes in the mood, including worsening of depression or anxiety.  There have even been reports of suicides caused by the worsening in the depression.  If you take Chantix and develop any adverse changes in your mood or become more depressed or more anxious, then you should stop the medicatioon immediately and contact us.  Any side effects from Chantix usually resolve very quickly.      *  If you start Chantix, I will prescribe the first month, then ask that you contact me with an update after the first month, regardless of how you feel to see how the medication is working for you and to make sure no side effects.  If the medication is working well and there are no side effects, then I will continue the medication.     Option #2:   *  Start Nicotine replacement with either gum or lozenges starting the first day of not smoking.  Slowly decrease the amount of nicotine replacement over a few weeks until you no longer need it.  your body will adjust gradually to requiring no nicotine at all.  The first 1-2 weeks are the toughest as your body gets used to not having nicotine around.    *  Start Zyban (or Buproprion/Wellbutrin) 150 mg once daily first thing in the morning 1 week before your quit date.  If no side effects, then add second dose of 150 mg late in the afternoon or early evening.    *Potential side effects including but not limited to seizure (1 out of 1000 patients on Zyban may experience a seizure), GI upset, insomnia, headache, weight loss.  If your experience side effects while ion Zyban/Wellbutrin, then call [phone] ([facility] Internal Medicine Nurse Line) and let me know.  You only need to stay on the zyban for about 2-3 months before you can stop it.  When you have decided to longer take Zyban/Wellbutrin, then go down to 1 tablet per day again for 1 week, then stop completely.     *  OBESITY/WEIGHT LOSS:  ====================================================    *  Obesity  "is a major problem in this country.  Over 2/3 of adult Americans are overweight (BMI Over 25), and 1/3 are obese (BMI over 30)    *  Obesity is the leading cause of diabetes type II, which currently affects 1 out of 10 adult Americans and is projected to potentially affect 1 out of 3 adult Americans by 2040    *  Chronic obesity leads to \"insulin resistance\" which affects the carbohydrate (sugar) metabolism causing \"diabetisy\" which leads to type II Diabetes, increased visceral fat, a chronic low grade inflammatory state that leads to higher rates of vascular disease among other things.     *  Obesity is defined as BMI (body mass index) greater than 30.    *  Morbid obesity is defined as BMI over 40    Your most recent Body mass index is:  Body mass index is 21.79 kg/(m^2).    The main principles in weight loss are diet and exercise. You need to have both.      Dietary principles are aimed at keeping the fat content in your diet to less than 30% of total calories AND minimizing the simple carbohydrates (breads, sugars, pasta, etc.).  Complex carbohydrates such as wild rice, brown rice, legumes and most vegetables are OK.    *  Think \"Eat like a caveman\", eat \"primitive\" foods.    *  Keep your food shopping to the outside of the grocery store, do not eat foods that come from a bag or box, do not eat foods with bar codes.    *  Use the fork rule for all eating. [If you can't pick food up with a fork, then the food will not turn off hunger very well. Using this rule helps patients avoid choosing the wrong types of food, especially if you have had a bariatric surgery operation.   The \"wrong foods\" include liquid calories such as soup, juice, soda, slimfast, ice cream, and beer, crumbly foods such as chips, crackers, and cookies, and starch based foods such as pasta, too much rice, and too much bread.]     * Keep your calorie intake at least less than 1500 per day to start (under 1300 total if you want to be more " "aggressive), divided between 3 meals (try to have no meal more than 500 calories) and 2 snacks.      *  \"Learn what 500 calories looks like\" and try to keep all meals under 500 calories.      *  Drink one large glass of water BEFORE each meal.  This not only helps guard against dehydration, but it also helps provide additional \"fullness\" that will help reduce the amount of food that you will consume in a given meal by helping you fill up earlier.      *  Figure out what kind of calories you are taking in now at this time.  It is hard to change behaviors that you do not understand.      *  Eat breakfast every day.  Skipping meals has been shown to be one of the factors that correlates the highest with obesity, (even more than fast food).  Try to avoid the typical carbohydrates and sugars that dominate the typical American breakfast.  Try to get more protein in earlier in the day, this will make you less hungry later.       *  Try High Protein Ensure or Boost nutritional supplements (or similar versions) for breakfast if nothing else.      *  Avoid \"manufactured foods\" as much as possible.  My definition of a \"manufactured\" or \"processed\" food is any single food product that has more than 6 ingredients.     *  Keep your grocery shopping to the \"outside\" of the grocery store, this is where all the \"real food\" is located.      *  Try to limit all simple carbohydrate foods such as white breads (whole grain natural breads are OK), white rice (brown rice and wild rice are OK), pasta, noodles, \"snack foods\", candy, jam/jellies, cakes, pies, sweets, regular soda (sugar free is OK).    *  Limit the amount of citrus fruits such as oranges, clyde, grapefruit.  These contain a large amount of sugars and will raise your blood sugars very high.  Try to keep less than 3 pieces of fruit per day.  Grapefruits specifically can interfere with the metabolism of \"statin\" cholesterol lowering drugs and therefore should be avoided " "completely if you are on a statin medication.      *  Always eat three meals a day every day:  breakfast, lunch, and supper.    *  I do not recommend over the counter diet aids such as Metabolife or Dexatrim since they have a high risk of side effects mainly fast heart rate, insomnia, and nervousness.    *  Very hard to lose weight with diet changes alone.  You need to have regular exercise.  You should aim for either 3 hours per week total of cumulative physical aerobic activity or 10,000 steps per day of activity.  Buy a pedometer and keep track of your activity.  If your typical daily activity does not add up to 10,000 total steps, then make up the difference with walking or some sort of aerobic activity.          Good resources for nutrition are:    ---> \"Wheat Belly\" by Lino Issa  (a book about the many bad things processed wheat products (i.e. Bread) have caused in our country...which I believe has serious merit)       --->  \"The Paleo Diet\"  By Nataliia Levi.             --->\"In Defense of Food\"  Of \"Food Rules\" (Pk Turner) a book that goes into the causes obesity epidemic in our country    Bertram Turner:                    ---> \"Picture Perfect Weight Loss\" by Hair Delgadillo (another good book about choices)        ---> \"Eat This, Not That\" Series,  by Jeramie Parsons  (a book about food choices in the modern world)              ---> \"The Blood Sugar Solution\" by Frederick Gutierrez ( a book about obesity and insulin resistance leading to \"diabesity\")           Jaron Poon ( a guidebook for counting calories, and knowing the components of the food that you eat)       \"The Best Life Diet\"  (a complete diet program)             Aim for a Healthy Weight Web Page at www.nhlbi.nih.gov       Salters Diet       Nikitario Davies:  \"Eat More, Weigh Less\" or \"The Program for the Reversal of Heart Disease\"       [facility] web site  the food and nutrition link.       American Heart Association       American Diabetes " Association (www.diabetes.org)                    Follow-ups after your visit        Follow-up notes from your care team     Return in about 3 years (around 11/28/2021) for Annual physical.      Who to contact     If you have questions or need follow up information about today's clinic visit or your schedule please contact [facility] directly at [phone].  Normal or non-critical lab and imaging results will be communicated to you by Student Designedhart, letter or phone within 4 business days after the clinic has received the results. If you do not hear from us within 7 days, please contact the clinic through Student Designedhart or phone. If you have a critical or abnormal lab result, we will notify you by phone as soon as possible.  Submit refill requests through [facility] or call your pharmacy and they will forward the refill request to us. Please allow 3 business days for your refill to be completed.          Additional Information About Your Visit        Student Designedhart Information     [facility] gives you secure access to your electronic health record. If you see a primary care provider, you can also send messages to your care team and make appointments. If you have questions, please call your primary care clinic.  If you do not have a primary care provider, please call [phone] and they will assist you.        Care EveryWhere ID     This is your Care EveryWhere ID. This could be used by other organizations to access your [facility] medical records  [id number]        Your Vitals Were     Pulse Temperature Pulse Oximetry BMI (Body Mass Index)          98 98.2  F (36.8  C) (Oral) 99% 21.79 kg/m2         Blood Pressure from Last 3 Encounters:   11/28/18 116/82   10/23/18 115/70   03/17/18 121/71    Weight from Last 3 Encounters:   11/28/18 135 lb (61.2 kg)   10/23/18 135 lb 6.4 oz (61.4 kg)   03/16/18 134 lb 8 oz (61 kg)              We Performed the Following     CBC with platelets     Comprehensive metabolic panel      Lipid panel reflex to direct LDL Fasting     TSH with free T4 reflex        Primary Care Provider Office Phone # Fax #    Claudio Menon [phone]       [address] [facility] [address]        Equal Access to Services     MARLENE COLON : Hadii meme he hadkendallo Soomaali, waaxda luqadaha, qaybta kaalmada adeegyada, demar abdiasin hayaan nurys vjtyler griffin. So [facility] [phone].    ATENCIÓN: Si habla español, tiene a jo disposición servicios gratuitos de asistencia lingüística. Llame al [phone].    We comply with applicable federal civil rights laws and [place] laws. We do not discriminate on the basis of race, color, national origin, age, disability, sex, sexual orientation, or gender identity.            Thank you!     Thank you for choosing [facility]  for your care. Our goal is always to provide you with excellent care. Hearing back from our patients is one way we can continue to improve our services. Please take a few minutes to complete the written survey that you may receive in the mail after your visit with us. Thank you!             Your Updated Medication List - Protect others around you: Learn how to safely use, store and throw away your medicines at www.disposemymeds.org.      Notice  As of 11/28/2018  1:56 PM    You have not been prescribed any medications.

## 2019-01-01 ENCOUNTER — TRANSFERRED RECORDS (OUTPATIENT)
Dept: HEALTH INFORMATION MANAGEMENT | Facility: CLINIC | Age: 39
End: 2019-01-01

## 2019-01-01 LAB — PAP SMEAR - HIM PATIENT REPORTED: NEGATIVE

## 2019-01-02 ENCOUNTER — OFFICE VISIT (OUTPATIENT)
Dept: DERMATOLOGY | Facility: CLINIC | Age: 39
End: 2019-01-02
Payer: COMMERCIAL

## 2019-01-02 VITALS — HEART RATE: 87 BPM | DIASTOLIC BLOOD PRESSURE: 81 MMHG | SYSTOLIC BLOOD PRESSURE: 127 MMHG | OXYGEN SATURATION: 100 %

## 2019-01-02 DIAGNOSIS — L81.4 LENTIGINES: ICD-10-CM

## 2019-01-02 DIAGNOSIS — D22.9 ATYPICAL NEVI: ICD-10-CM

## 2019-01-02 DIAGNOSIS — D22.9 MULTIPLE BENIGN NEVI: ICD-10-CM

## 2019-01-02 DIAGNOSIS — D48.5 NEOPLASM OF UNCERTAIN BEHAVIOR OF SKIN: Primary | ICD-10-CM

## 2019-01-02 PROCEDURE — 11102 TANGNTL BX SKIN SINGLE LES: CPT | Performed by: PHYSICIAN ASSISTANT

## 2019-01-02 PROCEDURE — 88305 TISSUE EXAM BY PATHOLOGIST: CPT | Mod: TC | Performed by: PHYSICIAN ASSISTANT

## 2019-01-02 PROCEDURE — 99243 OFF/OP CNSLTJ NEW/EST LOW 30: CPT | Mod: 25 | Performed by: PHYSICIAN ASSISTANT

## 2019-01-02 PROCEDURE — 11103 TANGNTL BX SKIN EA SEP/ADDL: CPT | Performed by: PHYSICIAN ASSISTANT

## 2019-01-02 NOTE — LETTER
1/2/2019         RE: Yvonne Sesay  [address]  [facility] [address]        Dear Colleague,    Thank you for referring your patient, Yvonne Sesay, to the [facility]. Please see a copy of my visit note below.    HPI:  I was asked to see pt by Dr. Menon. Yvonne Sesay is a 38 year old female patient here today for growth on right thigh .  Patient states this has been present for years.  Patient reports the following symptoms: growing and rubbing on clothers .  Patient reports the following previous treatments: none.  Patient reports the following modifying factors: none.  Associated symptoms: none.  Patient has no other skin complaints today.  Remainder of the HPI, Meds, PMH, Allergies, FH, and SH was reviewed in chart.    Pertinent Hx:   Mole excised on back in the past. Pt unsure if it was atypical. No personal or family history of skin cancer    Past Medical History:   Diagnosis Date     MTHFR mutation (methylenetetrahydrofolate reductase) (H)      RPLS (reversible posterior leukoencephalopathy syndrome)        Past Surgical History:   Procedure Laterality Date     ------------OTHER-------------      mole removal     LAPAROSCOPIC APPENDECTOMY N/A 3/16/2018    Procedure: LAPAROSCOPIC APPENDECTOMY;  LAPAROSCOPIC APPENDECTOMY;  Surgeon: Jammie Greenwood MD;  Location: [facility] OR     teeth extraction[          Family History   Problem Relation Age of Onset     Lipids Mother      Prostate Cancer Father      Lipids Father      Family History Negative Sister      Family History Negative Sister      Family History Negative Brother        Social History     Socioeconomic History     Marital status:      Spouse name: Not on file     Number of children: Not on file     Years of education: Not on file     Highest education level: Not on file   Social Needs     Financial resource strain: Not on file     Food insecurity - worry: Not on file     Food insecurity - inability: Not  on file     Transportation needs - medical: Not on file     Transportation needs - non-medical: Not on file   Occupational History     Occupation:      Employer: [employer]     Comment: [employer]   Tobacco Use     Smoking status: Current Every Day Smoker     Packs/day: 0.50     Types: Cigarettes     Smokeless tobacco: Never Used     Tobacco comment: would like information in regards to smoking cessation post delivery.   Substance and Sexual Activity     Alcohol use: Yes     Alcohol/week: 0.0 oz     Comment: occ     Drug use: Yes     Types: Marijuana     Sexual activity: Yes     Partners: Male     Birth control/protection: None   Other Topics Concern     Parent/sibling w/ CABG, MI or angioplasty before 65F 55M? Not Asked   Social History Narrative     Not on file       No outpatient encounter medications on file as of 1/2/2019.     Facility-Administered Encounter Medications as of 1/2/2019   Medication Dose Route Frequency Provider Last Rate Last Dose     fentaNYL (SUBLIMAZE) injection   EPIDURAL PRN Rashad Blevins MD   100 mcg at 11/12/14 1414     ROPivacaine (NAROPIN) injection    PRN Rashad Blevins MD   10 mL at 11/12/14 1413       Review Of Systems:  Skin: As above  Eyes: negative  Ears/Nose/Throat: negative  Respiratory: No shortness of breath, dyspnea on exertion, cough, or hemoptysis  Cardiovascular: negative  Gastrointestinal: negative  Genitourinary: negative  Musculoskeletal: negative  Neurologic: negative  Psychiatric: negative  Hematologic/Lymphatic/Immunologic: negative  Endocrine: negative      Objective:     /81   Pulse 87   SpO2 100%   Eyes: Conjunctivae/lids: Normal   ENT: Lips:  Normal  MSK: Normal  Cardiovascular: Peripheral edema none  Pulm: Breathing Normal  Neuro/Psych: Orientation: Normal; Mood/Affect: Normal, NAD, WDWN  Pt accompanied by: self  Following areas examined: Scalp, face, eyelids, lips, neck, chest, abdomen, back, buttocks, and R&L  upper and lower extremities.    Wesley skin type:i   Findings:  Tan WD smooth macules on face, neck, trunk, and extremities.  Red smooth well-defined macules on trunk and extremities.  Atypical appearing macules with symmetric color distribution on trunk  2-5mm  Well circumscribed macules with symmetric color distribution on trunk and extremities 2-4mm  Soft fleshy pedunculated nodule on right mediolateral proximal thigh 0.8cm  Ill defined light and medium brown macule on left lateral lumbar back 0.5cm  Wd medium brown and red macule with irregular pigment network on Right inferior quadrant 0.7cm  Assessment and Plan:  1) Lentigines, multiple benign nevi, cherry angiomas    Treatment of these lesions would be purely cosmetic and not medically neccessary.  Lesion may recur and/or may not completely resolve. May need additional treatment.  Different removal options including excision, cryotherapy, cautery and /or laser.      2) Neoplasm of uncertain behavior on right mediolateral proximal thigh 0.8cm  Rule out atypical nevus  TANGENTIAL BIOPSY:  After consent, anesthesia with LEC and prep, tangential biopsy performed.  No complications and routine wound care.  May grow back and will get a scar. Based on lesion type may need to completely remove lesion. Patient will be notified in 7-10 days of results. Wound care directions given.    3) Neoplasm of uncertain behavior on left lateral lumbar back 0.5cm  Rule out atypical nevus  TANGENTIAL BIOPSY:  After consent, anesthesia with LEC and prep, tangential biopsy performed.  No complications and routine wound care.  May grow back and will get a scar. Based on lesion type may need to completely remove lesion. Patient will be notified in 7-10 days of results. Wound care directions given.    4) Neoplasm of uncertain behavior Right inferior quadrant 0.7cm  Neurofibroma vs acrochordon vs lipoma  TANGENTIAL BIOPSY:  After consent, anesthesia with LEC and prep, tangential  biopsy performed.  No complications and routine wound care.  May grow back and will get a scar. Based on lesion type may need to completely remove lesion. Patient will be notified in 7-10 days of results. Wound care directions given.  May need vertical excision.     5) atypical appearing nevi  Watch and monitor     Signs and Symptoms of non-melanoma skin cancer and ABCDEs of melanoma reviewed with patient. Patient encouraged to perform monthly self skin exams and educated on how to perform them. UV precautions reviewed with patient. Patient was asked about new or changing moles/lesions on body.   Wear a sunscreen with at least SPF 30 on your face, ears, neck and V of the chest daily. Wear sunscreen on other areas of the body if those areas are exposed to the sun throughout the day. Sunscreens can contain physical and/or chemical blockers. Physical blockers are less likely to clog pores, these include zinc oxide and titanium dioxide. Reapply every two hour and after swimming. Sunscreen examples include Neutrogena, CeraVe, Blue Lizard, Elta MD and many others.    Proper skin care from [facility]:    -Eliminate harsh soaps as they strip the natural oils from the skin, often resulting in dry itchy skin ( i.e. Dial, Zest, Tiffanie Spring)  -Use mild soaps such as Cetaphil or Dove Sensitive Skin in the shower. You do not need to use soap on arms, legs, and trunk every time you shower unless visibly soiled.   -Avoid hot or cold showers.  -After showering, lightly dry off and apply moisturizing within 2-3 minutes. This will help trap moisture in the skin.   -Aggressive use of a moisturizer at least 1-2 times a day to the entire body (including -Vanicream, Cetaphil, Aquaphor or Cerave) and moisturize hands after every washing.  -We recommend using moisturizers that come in a tub that needs to be scooped out, not a pump. This has more of an oil base. It will hold moisture in your skin much better than a water base  moisturizer. The above recommended are non-pore clogging.       Follow up in yearly FBE      Again, thank you for allowing me to participate in the care of your patient.        Sincerely,        Pavithra Desai PA-C

## 2019-01-02 NOTE — PROGRESS NOTES
HPI:  I was asked to see pt by Dr. Menon. Yvonne Sesay is a 38 year old female patient here today for growth on right thigh .  Patient states this has been present for years.  Patient reports the following symptoms: growing and rubbing on clothers .  Patient reports the following previous treatments: none.  Patient reports the following modifying factors: none.  Associated symptoms: none.  Patient has no other skin complaints today.  Remainder of the HPI, Meds, PMH, Allergies, FH, and SH was reviewed in chart.    Pertinent Hx:   Mole excised on back in the past. Pt unsure if it was atypical. No personal or family history of skin cancer    Past Medical History:   Diagnosis Date     MTHFR mutation (methylenetetrahydrofolate reductase) (H)      RPLS (reversible posterior leukoencephalopathy syndrome)        Past Surgical History:   Procedure Laterality Date     ------------OTHER-------------      mole removal     LAPAROSCOPIC APPENDECTOMY N/A 3/16/2018    Procedure: LAPAROSCOPIC APPENDECTOMY;  LAPAROSCOPIC APPENDECTOMY;  Surgeon: Jammie Greenwood MD;  Location: [facility] OR     teeth extraction[          Family History   Problem Relation Age of Onset     Lipids Mother      Prostate Cancer Father      Lipids Father      Family History Negative Sister      Family History Negative Sister      Family History Negative Brother        Social History     Socioeconomic History     Marital status:      Spouse name: Not on file     Number of children: Not on file     Years of education: Not on file     Highest education level: Not on file   Social Needs     Financial resource strain: Not on file     Food insecurity - worry: Not on file     Food insecurity - inability: Not on file     Transportation needs - medical: Not on file     Transportation needs - non-medical: Not on file   Occupational History     Occupation:      Employer: [employer]     Comment: [employer]   Tobacco Use      Smoking status: Current Every Day Smoker     Packs/day: 0.50     Types: Cigarettes     Smokeless tobacco: Never Used     Tobacco comment: would like information in regards to smoking cessation post delivery.   Substance and Sexual Activity     Alcohol use: Yes     Alcohol/week: 0.0 oz     Comment: occ     Drug use: Yes     Types: Marijuana     Sexual activity: Yes     Partners: Male     Birth control/protection: None   Other Topics Concern     Parent/sibling w/ CABG, MI or angioplasty before 65F 55M? Not Asked   Social History Narrative     Not on file       No outpatient encounter medications on file as of 1/2/2019.     Facility-Administered Encounter Medications as of 1/2/2019   Medication Dose Route Frequency Provider Last Rate Last Dose     fentaNYL (SUBLIMAZE) injection   EPIDURAL PRN Rashad Blevins MD   100 mcg at 11/12/14 1414     ROPivacaine (NAROPIN) injection    PRN Rsahad Blevins MD   10 mL at 11/12/14 1413       Review Of Systems:  Skin: As above  Eyes: negative  Ears/Nose/Throat: negative  Respiratory: No shortness of breath, dyspnea on exertion, cough, or hemoptysis  Cardiovascular: negative  Gastrointestinal: negative  Genitourinary: negative  Musculoskeletal: negative  Neurologic: negative  Psychiatric: negative  Hematologic/Lymphatic/Immunologic: negative  Endocrine: negative      Objective:     /81   Pulse 87   SpO2 100%   Eyes: Conjunctivae/lids: Normal   ENT: Lips:  Normal  MSK: Normal  Cardiovascular: Peripheral edema none  Pulm: Breathing Normal  Neuro/Psych: Orientation: Normal; Mood/Affect: Normal, NAD, WDWN  Pt accompanied by: self  Following areas examined: Scalp, face, eyelids, lips, neck, chest, abdomen, back, buttocks, and R&L upper and lower extremities.    Wesley skin type:i   Findings:  Tan WD smooth macules on face, neck, trunk, and extremities.  Red smooth well-defined macules on trunk and extremities.  Atypical appearing macules with symmetric color distribution on  trunk  2-5mm  Well circumscribed macules with symmetric color distribution on trunk and extremities 2-4mm  Soft fleshy pedunculated nodule on right mediolateral proximal thigh 0.8cm  Ill defined light and medium brown macule on left lateral lumbar back 0.5cm  Wd medium brown and red macule with irregular pigment network on Right inferior quadrant 0.7cm  Assessment and Plan:  1) Lentigines, multiple benign nevi, cherry angiomas    Treatment of these lesions would be purely cosmetic and not medically neccessary.  Lesion may recur and/or may not completely resolve. May need additional treatment.  Different removal options including excision, cryotherapy, cautery and /or laser.      2) Neoplasm of uncertain behavior on right mediolateral proximal thigh 0.8cm  Rule out atypical nevus  TANGENTIAL BIOPSY:  After consent, anesthesia with LEC and prep, tangential biopsy performed.  No complications and routine wound care.  May grow back and will get a scar. Based on lesion type may need to completely remove lesion. Patient will be notified in 7-10 days of results. Wound care directions given.    3) Neoplasm of uncertain behavior on left lateral lumbar back 0.5cm  Rule out atypical nevus  TANGENTIAL BIOPSY:  After consent, anesthesia with LEC and prep, tangential biopsy performed.  No complications and routine wound care.  May grow back and will get a scar. Based on lesion type may need to completely remove lesion. Patient will be notified in 7-10 days of results. Wound care directions given.    4) Neoplasm of uncertain behavior Right inferior quadrant 0.7cm  Neurofibroma vs acrochordon vs lipoma  TANGENTIAL BIOPSY:  After consent, anesthesia with LEC and prep, tangential biopsy performed.  No complications and routine wound care.  May grow back and will get a scar. Based on lesion type may need to completely remove lesion. Patient will be notified in 7-10 days of results. Wound care directions given.  May need vertical  excision.     5) atypical appearing nevi  Watch and monitor     Signs and Symptoms of non-melanoma skin cancer and ABCDEs of melanoma reviewed with patient. Patient encouraged to perform monthly self skin exams and educated on how to perform them. UV precautions reviewed with patient. Patient was asked about new or changing moles/lesions on body.   Wear a sunscreen with at least SPF 30 on your face, ears, neck and V of the chest daily. Wear sunscreen on other areas of the body if those areas are exposed to the sun throughout the day. Sunscreens can contain physical and/or chemical blockers. Physical blockers are less likely to clog pores, these include zinc oxide and titanium dioxide. Reapply every two hour and after swimming. Sunscreen examples include Neutrogena, CeraVe, Blue Lizard, Elta MD and many others.    Proper skin care from [facility]:    -Eliminate harsh soaps as they strip the natural oils from the skin, often resulting in dry itchy skin ( i.e. Dial, Zest, [place] Spring)  -Use mild soaps such as Cetaphil or Dove Sensitive Skin in the shower. You do not need to use soap on arms, legs, and trunk every time you shower unless visibly soiled.   -Avoid hot or cold showers.  -After showering, lightly dry off and apply moisturizing within 2-3 minutes. This will help trap moisture in the skin.   -Aggressive use of a moisturizer at least 1-2 times a day to the entire body (including -Vanicream, Cetaphil, Aquaphor or Cerave) and moisturize hands after every washing.  -We recommend using moisturizers that come in a tub that needs to be scooped out, not a pump. This has more of an oil base. It will hold moisture in your skin much better than a water base moisturizer. The above recommended are non-pore clogging.       Follow up in yearly FBE

## 2019-01-09 LAB — COPATH REPORT: NORMAL

## 2019-05-17 ENCOUNTER — TELEPHONE (OUTPATIENT)
Dept: INTERNAL MEDICINE | Facility: CLINIC | Age: 39
End: 2019-05-17

## 2019-05-17 NOTE — TELEPHONE ENCOUNTER
Panel Management Review      Patient has the following on her problem list: None      Composite cancer screening  Chart review shows that this patient is due/due soon for the following Pap Smear  Summary:    Patient is due/failing the following:   PAP    Action needed:   Patient needs office visit for pap.    Type of outreach:    Sent [email or web address] message. and Sent letter.    Questions for provider review:    None                                                                                                                                    Yessi Vergara       Chart routed to none .

## 2019-05-17 NOTE — LETTER
[facility]  [address] [address]  [phone]  May 17, 2019    Yvonne Sesay  [address]  [facility] [address]    Dear Yvonne,    We care about your health and based on a review of your medical records, recommend the the following, to better manage your health:      You are in particular need of attention regarding:  -Cervical Cancer Screening    I am recommending that you:     -schedule a PAP SMEAR EXAM which is due.  Please disregard this reminder if you have had this exam elsewhere within the last year.  It would be helpful for us to have a copy of your recent pap smear report in our file so that we can best coordinate your care.    If you are under/uninsured, we recommend you contact the [facility]. They offer pap smears at no charge or on a sliding fee charge. You can schedule with them at [phone]. Please have them send us the results.      Here is a list of Health Maintenance topics that are due now or due soon:  Health Maintenance Due   Topic Date Due     HIV SCREEN (SYSTEM ASSIGNED)  11/15/1999     Pap Smear - every 3 years  11/15/2002     PHQ-2  01/01/2019       Please call us at [phone] or [phone] (or use [employer]) to address the above recommendations.     Thank you for trusting [facility].  We appreciate the opportunity to serve you and look forward to supporting your healthcare needs in the future.    If you have (or plan to have) any of these tests done at a facility other than a [facility] or a [facility], please have the results from these tests sent to your primary physician at [facility].    Healthy Regards,    Claudio Menon MD

## 2019-06-20 ENCOUNTER — OFFICE VISIT (OUTPATIENT)
Dept: FAMILY MEDICINE | Facility: CLINIC | Age: 39
End: 2019-06-20
Payer: COMMERCIAL

## 2019-06-20 VITALS
RESPIRATION RATE: 16 BRPM | HEART RATE: 102 BPM | TEMPERATURE: 99.8 F | DIASTOLIC BLOOD PRESSURE: 70 MMHG | WEIGHT: 135 LBS | BODY MASS INDEX: 21.69 KG/M2 | HEIGHT: 66 IN | OXYGEN SATURATION: 100 % | SYSTOLIC BLOOD PRESSURE: 106 MMHG

## 2019-06-20 DIAGNOSIS — J02.0 STREPTOCOCCAL PHARYNGITIS: Primary | ICD-10-CM

## 2019-06-20 DIAGNOSIS — J02.9 SORE THROAT: ICD-10-CM

## 2019-06-20 LAB
DEPRECATED S PYO AG THROAT QL EIA: ABNORMAL
SPECIMEN SOURCE: ABNORMAL

## 2019-06-20 PROCEDURE — 87880 STREP A ASSAY W/OPTIC: CPT | Performed by: PHYSICIAN ASSISTANT

## 2019-06-20 PROCEDURE — 99213 OFFICE O/P EST LOW 20 MIN: CPT | Performed by: PHYSICIAN ASSISTANT

## 2019-06-20 RX ORDER — PENICILLIN V POTASSIUM 500 MG/1
500 TABLET, FILM COATED ORAL 3 TIMES DAILY
Qty: 30 TABLET | Refills: 0 | Status: SHIPPED | OUTPATIENT
Start: 2019-06-20 | End: 2019-08-28

## 2019-06-20 ASSESSMENT — ENCOUNTER SYMPTOMS
GASTROINTESTINAL NEGATIVE: 1
ENDOCRINE NEGATIVE: 1
PSYCHIATRIC NEGATIVE: 1
MUSCULOSKELETAL NEGATIVE: 1
NEUROLOGICAL NEGATIVE: 1
EYES NEGATIVE: 1
CARDIOVASCULAR NEGATIVE: 1

## 2019-06-20 ASSESSMENT — MIFFLIN-ST. JEOR: SCORE: 1314.11

## 2019-06-20 NOTE — PATIENT INSTRUCTIONS
Penicillin is prescribed.   Treatment includes NSAIDs or acetaminophen, lozenges or hard candy, tea or other warm fluids, and hydration.

## 2019-06-20 NOTE — PROGRESS NOTES
Subjective     Yvonne Sesay is a 38 year old female who presents to clinic today for the following health issues:    HPI   Acute Illness   Acute illness concerns: sore throat, vomiting  Onset: started yesterday    Fever: no    Chills/Sweats: YES- chills    Headache (location?): no    Sinus Pressure:no    Conjunctivitis:  no    Ear Pain: no    Rhinorrhea: no    Congestion: no    Sore Throat: YES     Cough: no    Wheeze: no    Decreased Appetite: YES    Nausea: YES    Vomiting: YES    Diarrhea:  yes    Dysuria/Freq.: no    Fatigue/Achiness: YES    Sick/Strep Exposure: no     Therapies Tried and outcome: gingerale        Patient Active Problem List   Diagnosis     CARDIOVASCULAR SCREENING; LDL GOAL LESS THAN 130     Twins     Normal vaginal delivery     Indication for care in labor or delivery     NVD (normal vaginal delivery)     Past Surgical History:   Procedure Laterality Date     ------------OTHER-------------      mole removal     LAPAROSCOPIC APPENDECTOMY N/A 3/16/2018    Procedure: LAPAROSCOPIC APPENDECTOMY;  LAPAROSCOPIC APPENDECTOMY;  Surgeon: Jammie Greenwood MD;  Location: [facility] OR     teeth extraction[         Social History     Tobacco Use     Smoking status: Current Every Day Smoker     Packs/day: 0.50     Types: Cigarettes     Smokeless tobacco: Never Used     Tobacco comment: would like information in regards to smoking cessation post delivery.   Substance Use Topics     Alcohol use: Yes     Alcohol/week: 0.0 oz     Comment: occ     Family History   Problem Relation Age of Onset     Lipids Mother      Prostate Cancer Father      Lipids Father      Family History Negative Sister      Family History Negative Sister      Family History Negative Brother          Current Outpatient Medications   Medication Sig Dispense Refill     penicillin V (VEETID) 500 MG tablet Take 1 tablet (500 mg) by mouth 3 times daily for 10 days 30 tablet 0     No Known Allergies    Reviewed and updated as needed this visit by  "Provider         Review of Systems   Constitutional:        As in HPI   HENT:        As in HPI   Eyes: Negative.    Respiratory:        As in HPI   Cardiovascular: Negative.    Gastrointestinal: Negative.    Endocrine: Negative.    Genitourinary: Negative.    Musculoskeletal: Negative.    Skin: Negative.    Neurological: Negative.    Psychiatric/Behavioral: Negative.          Objective    /70 (Cuff Size: Adult Regular)   Pulse 102   Temp 99.8  F (37.7  C) (Tympanic)   Resp 16   Ht 1.676 m (5' 6\")   Wt 61.2 kg (135 lb)   SpO2 100%   BMI 21.79 kg/m    Physical Exam   Constitutional: She is oriented to person, place, and time. She appears well-developed and well-nourished.   HENT:   Head: Normocephalic.   Right Ear: Tympanic membrane and ear canal normal.   Left Ear: Tympanic membrane and ear canal normal.   Nose: No mucosal edema or rhinorrhea.   Mouth/Throat: Uvula is midline. Oropharyngeal exudate, posterior oropharyngeal edema and posterior oropharyngeal erythema present. Tonsils are 3+ on the right. Tonsils are 3+ on the left.   Eyes: Conjunctivae and EOM are normal.   Cardiovascular: Normal rate, regular rhythm and normal heart sounds.   Pulmonary/Chest: Effort normal and breath sounds normal.   Neurological: She is alert and oriented to person, place, and time.   Skin: Skin is warm.   Psychiatric: She has a normal mood and affect.       Diagnostic Test Results:  Results for orders placed or performed in visit on 06/20/19 (from the past 24 hour(s))   Strep, Rapid Screen   Result Value Ref Range    Specimen Description Throat     Rapid Strep A Screen (A)      POSITIVE: Group A Streptococcal antigen detected by immunoassay.           Assessment & Plan   Problem List Items Addressed This Visit     None      Visit Diagnoses     Streptococcal pharyngitis    -  Primary    Relevant Medications    penicillin V (VEETID) 500 MG tablet    Sore throat        Relevant Orders    Strep, Rapid Screen (Completed)    "        Tobacco Cessation:   reports that she has been smoking cigarettes.  She has been smoking about 0.50 packs per day. She has never used smokeless tobacco.  Tobacco Cessation Action Plan: not addressed today      Patient Instructions   Penicillin is prescribed.   Treatment includes NSAIDs or acetaminophen, lozenges or hard candy, tea or other warm fluids, and hydration.          Return in about 1 week (around 6/27/2019) for a recheck if you are not improved.    Rubi Maher PA-C  [facility]

## 2019-08-24 ENCOUNTER — NURSE TRIAGE (OUTPATIENT)
Dept: NURSING | Facility: CLINIC | Age: 39
End: 2019-08-24

## 2019-08-24 ENCOUNTER — HOSPITAL ENCOUNTER (EMERGENCY)
Facility: CLINIC | Age: 39
Discharge: HOME | End: 2019-08-24
Attending: EMERGENCY MEDICINE | Admitting: EMERGENCY MEDICINE
Payer: COMMERCIAL

## 2019-08-24 VITALS
WEIGHT: 126 LBS | HEIGHT: 66 IN | BODY MASS INDEX: 20.25 KG/M2 | DIASTOLIC BLOOD PRESSURE: 80 MMHG | OXYGEN SATURATION: 100 % | SYSTOLIC BLOOD PRESSURE: 134 MMHG | TEMPERATURE: 97.7 F | HEART RATE: 80 BPM | RESPIRATION RATE: 16 BRPM

## 2019-08-24 DIAGNOSIS — F32.A ANXIETY AND DEPRESSION: ICD-10-CM

## 2019-08-24 DIAGNOSIS — F41.9 ANXIETY AND DEPRESSION: ICD-10-CM

## 2019-08-24 PROCEDURE — 99285 EMERGENCY DEPT VISIT HI MDM: CPT | Mod: 25

## 2019-08-24 PROCEDURE — 90791 PSYCH DIAGNOSTIC EVALUATION: CPT

## 2019-08-24 RX ORDER — ONDANSETRON 4 MG/1
4 TABLET, ORALLY DISINTEGRATING ORAL EVERY 6 HOURS PRN
Qty: 10 TABLET | Refills: 0 | Status: SHIPPED | OUTPATIENT
Start: 2019-08-24 | End: 2019-08-27

## 2019-08-24 ASSESSMENT — ENCOUNTER SYMPTOMS
APPETITE CHANGE: 1
VOMITING: 0
NERVOUS/ANXIOUS: 1
NAUSEA: 1
DYSPHORIC MOOD: 1
DECREASED CONCENTRATION: 1

## 2019-08-24 ASSESSMENT — MIFFLIN-ST. JEOR: SCORE: 1273.28

## 2019-08-24 NOTE — ED AVS SNAPSHOT
Emergency Department  [address] [place] [id number]  Phone:  [phone]  Fax:  [phone]                                    Yvonne Sesay   MRN: [id number]    Department:   Emergency Department   Date of Visit:  8/24/2019           After Visit Summary Signature Page    I have received my discharge instructions, and my questions have been answered. I have discussed any challenges I see with this plan with the nurse or doctor.    ..........................................................................................................................................  Patient/Patient Representative Signature      ..........................................................................................................................................  Patient Representative Print Name and Relationship to Patient    ..................................................               ................................................  Date                                   Time    ..........................................................................................................................................  Reviewed by Signature/Title    ...................................................              ..............................................  Date                                               Time          22EPIC Rev 08/18

## 2019-08-24 NOTE — ED PROVIDER NOTES
History     Chief Complaint:  Depression    HPI   Yvonne Sesay is a 38 year old female who presents to the emergency department for evaluation of depression. The patient reports she has been feeling overwhelmed, anxious, and depressed for the last 3-4 weeks. She denies suicidal and homicidal ideation. The patient reports she is a single mother to four children ages 4, 6, 6, and 8. She is a 6th grade  and has been home with her children all summer. The patient indicates she and the children's father got  4 years ago after she found out he had been cheating on her and spending their money on prostitutes, and at that time she also felt anxious and depressed. She reports their father is little help with this kids, and the situation with him escalated at the end of July, causing increased stress. The children are with their father for the weekend, and she notes she is both relieved and anxious about them staying with him. The patient states she has seen a therapist in the past and discussed possibly starting on Zoloft, although she did not end up starting on it. She reports having difficulty eating due to an upset stomach and nausea. She denies vomiting. The patient reports occasionally drinking and denies drug use. Of note, the patient reports she had teacher in-service this week and has had trouble concentrating, and school starts again on Monday.     Allergies:  No Known Drug Allergies    Medications:    The patient is currently on no regular medications.      Past Medical History:    MTHFR mutation   RPLS    Past Surgical History:    Mole removal  Laparoscopic appendectomy   Teeth extraction     Family History:    HLD  Prostate cancer     Social History:  Presents with mother.  Current every day smoker, 0.5 ppd.  Positive for alcohol use.    Positive for marijuana use.   Marital Status:       Review of Systems   Constitutional: Positive for appetite change.   Gastrointestinal:  "Positive for nausea. Negative for vomiting.   Psychiatric/Behavioral: Positive for decreased concentration and dysphoric mood. Negative for self-injury and suicidal ideas. The patient is nervous/anxious.    All other systems reviewed and are negative.    Physical Exam     Patient Vitals for the past 24 hrs:   BP Temp Temp src Pulse Resp SpO2 Height Weight   08/24/19 0925 134/80 97.7  F (36.5  C) Oral 80 16 100 % 1.676 m (5' 6\") 57.2 kg (126 lb)      Physical Exam  Nursing note and vitals reviewed.  Constitutional:  Oriented to person, place, and time. Cooperative.   HENT:   Nose:    Nose normal.   Mouth/Throat:   Mucous membranes are normal.   Eyes:    Conjunctivae normal and EOM are normal.      Pupils are equal, round, and reactive to light.   Neck:    Trachea normal.   Cardiovascular:  Normal rate, regular rhythm, normal heart sounds and normal pulses. No murmur heard.  Pulmonary/Chest:  Effort normal and breath sounds normal.   Abdominal:   Soft. Normal appearance and bowel sounds are normal.      There is no tenderness.      There is no rebound and no CVA tenderness.   Musculoskeletal:  Extremities atraumatic x 4.   Lymphadenopathy:  No cervical adenopathy.   Neurological:   Alert and oriented to person, place, and time. Normal strength.      No cranial nerve deficit or sensory deficit. GCS eye subscore is 4. GCS verbal subscore is 5. GCS motor subscore is 6.   Skin:    Skin is intact. No rash noted.   Psychiatric:   Somewhat depressed and anxious affect but no suicidal ideation or homicidal ideation.  At times tearful.    Emergency Department Course     Emergency Department Course:  Nursing notes and vitals reviewed.   0945 I performed an exam of the patient as documented above.     1125 I rechecked the patient and discussed the results of her workup thus far.     Findings and plan explained to the Patient. Patient discharged home with instructions regarding supportive care, medications, and reasons to return. " The importance of close follow-up was reviewed. The patient was prescribed Zofran.    Impression & Plan      Medical Decision Making:  This is a 38-year-old female who came in with worsening depression and anxiety due to significant stressors in her life.  While she was at times tearful here and clearly depressed, I feel that she is not suicidal and does not require admission to the hospital.  The patient is in agreement with this as well.  I had RAVI, our DEC , see the patient as well.  RAVI was able to set up an appointment with a psychiatrist in 3 days from now.  She also already has a therapist that she can follow-up with.  I recommended following up with her primary physician as well and certainly returning with any concerns or worsening symptoms.  She is comfortable waiting until Tuesday when she sees a psychiatrist to discuss any medications and specifically antidepressants.    Diagnosis:    ICD-10-CM   1. Anxiety and depression F41.9    F32.9     Disposition:  discharged to home    Discharge Medications:  Discharge Medication List as of 8/24/2019 11:32 AM      START taking these medications    Details   ondansetron (ZOFRAN ODT) 4 MG ODT tab Take 1 tablet (4 mg) by mouth every 6 hours as needed for nausea, Disp-10 tablet, R-0, Local Print        Scribe Disclosure:  I, Crystal Kapoor, am serving as a scribe on 8/24/2019 at 9:51 AM to personally document services performed by Jimi David MD based on my observations and the provider's statements to me.      Crystal Kapoor  8/24/2019    EMERGENCY DEPARTMENT       Jimi David MD  08/25/19 0887

## 2019-08-24 NOTE — ED TRIAGE NOTES
Pt c/o feeling depressed, anxious and overwhelmed for past 3-4 weeks. Stress at home with children and being a single parent. Denies suicidal ideation. C/o decreased appetite and weight loss of approx 10 pounds in past month.

## 2019-08-24 NOTE — TELEPHONE ENCOUNTER
"Patient states she met with a therapist \"last week and was advised to try Zoloft.\"   Currently Patient states \"I need some help.\"   Reports being physically safe at this time. States \"my mom is with me.\"  Denies having a plan to hurt self or others.     States \"things have mounted in past couple of weeks with my ex.\"  Denies physical abuse.  Reports not eating x 2 weeks. States concern that \"I feel like vomiting.\"  Reports has not slept x 2 weeks.  Currently having difficulty with activities of daily living by report.     This RN notes Patient is crying during triage.   Alert and oriented.   This RN concerned re severity of Patient's symptoms as presented.  Per Patient request this RN also discussed Urgent Care.    Protocol-  Depression- Adult  Care advice reviewed.   Disposition-  Go to ED now.  Caller states understanding of the recommended disposition.   Caller plans to discuss with her mother. Caller agrees to go to ED at [facility] or  ([facility]). Has transportation.  Advised to call back if further questions or concerns.     KENIA Turner RN  [facility]     Reason for Disposition    [1] Depression AND [2] unable to do any of normal activities (e.g., self care, school, work; in comparison to baseline).    Additional Information    Negative: Patient attempted suicide    Negative: Patient is threatening suicide now    Negative: Violent behavior, or threatening to physically hurt or kill someone    Negative: [1] Patient is very confused (disoriented, slurred speech) AND [2] no other adult (e.g., friend or family member) available    Negative: [1] Difficult to awaken or acting very confused (disoriented, slurred speech) AND [2] new onset    Negative: Sounds like a life-threatening emergency to the triager    Protocols used: DEPRESSION-A-AH    "

## 2019-08-28 ENCOUNTER — OFFICE VISIT (OUTPATIENT)
Dept: INTERNAL MEDICINE | Facility: CLINIC | Age: 39
End: 2019-08-28
Payer: COMMERCIAL

## 2019-08-28 VITALS
OXYGEN SATURATION: 100 % | HEART RATE: 98 BPM | SYSTOLIC BLOOD PRESSURE: 122 MMHG | WEIGHT: 131.6 LBS | RESPIRATION RATE: 12 BRPM | TEMPERATURE: 99 F | DIASTOLIC BLOOD PRESSURE: 78 MMHG | BODY MASS INDEX: 21.24 KG/M2

## 2019-08-28 DIAGNOSIS — F43.22 ADJUSTMENT DISORDER WITH ANXIOUS MOOD: Primary | ICD-10-CM

## 2019-08-28 PROCEDURE — 99214 OFFICE O/P EST MOD 30 MIN: CPT | Performed by: INTERNAL MEDICINE

## 2019-08-28 RX ORDER — LORAZEPAM 0.5 MG/1
0.5 TABLET ORAL DAILY PRN
Qty: 15 TABLET | Refills: 0 | Status: SHIPPED | OUTPATIENT
Start: 2019-08-28 | End: 2023-03-23

## 2019-08-28 RX ORDER — ESCITALOPRAM OXALATE 10 MG/1
TABLET ORAL
Qty: 30 TABLET | Refills: 0 | Status: SHIPPED | OUTPATIENT
Start: 2019-08-28 | End: 2019-09-30

## 2019-08-28 SDOH — HEALTH STABILITY: MENTAL HEALTH: HOW MANY STANDARD DRINKS CONTAINING ALCOHOL DO YOU HAVE ON A TYPICAL DAY?: 3 OR 4

## 2019-08-28 SDOH — HEALTH STABILITY: MENTAL HEALTH: HOW OFTEN DO YOU HAVE 6 OR MORE DRINKS ON ONE OCCASION?: NEVER

## 2019-08-28 SDOH — HEALTH STABILITY: MENTAL HEALTH: HOW OFTEN DO YOU HAVE A DRINK CONTAINING ALCOHOL?: 2-4 TIMES A MONTH

## 2019-08-28 NOTE — PROGRESS NOTES
"Subjective     Yvonne Sesay is a 38 year old female who presents to clinic today for the following health issues:    HPI   ED/UC Followup:    Facility:  [facility]  Date of visit: 08/24/19  Reason for visit: depression symptoms  Current Status: same symptoms       Abnormal Mood Symptoms      Duration: since beginning of August and is worsening    Description:  Depression: YES  Anxiety: doesn't know  Panic attacks: no      Accompanying signs and symptoms: see PHQ-9 and LATOYA scores    History (similar episodes/previous evaluation): ED and when her  and she  - Fall of 2015 (11/07/15)    Precipitating or alleviating factors:     Therapies tried and outcome: none    Here for follow-up regarding anxiety depression.  Reports the emergency room over the weekend when she felt that she was \"losing\".    Significant depression anxiety symptoms over the last 2 months tickly worsened with strained relationships and issues regarding her ex- and his behaviors guarding child support etc.  Please see emergency room note for further details.    Denies homicidal or suicidal ideation.    He has been having nausea and difficulty eating for the last few weeks.  Having some sleep difficulties with being able to get more than few hours of sleep per night.    Denies history of specific depression or anxiety in the past.  Has never been on medications before.    Was recommended to see a psychiatrist and attempted to make a appointment with a psychiatrist through the [facility] coordinator in the emergency room, however the psychiatrist where she was referred only deals specifically with substance abuse issues.    She has a therapist she has been seeing on a regular basis, has appointment biweekly, next appointment tomorrow.    Reviewed and updated as needed this visit by Provider         Review of Systems         Objective    LMP 08/19/2019 (Exact Date)   There is no height or weight on file to calculate " BMI.  Physical Exam                 Past Medical History:  ---------------------------  Past Medical History:   Diagnosis Date     MTHFR mutation (methylenetetrahydrofolate reductase) (H)      RPLS (reversible posterior leukoencephalopathy syndrome)        Past Surgical History:  ---------------------------  Past Surgical History:   Procedure Laterality Date     ------------OTHER-------------      mole removal     LAPAROSCOPIC APPENDECTOMY N/A 3/16/2018    Procedure: LAPAROSCOPIC APPENDECTOMY;  LAPAROSCOPIC APPENDECTOMY;  Surgeon: Jammie Greenwood MD;  Location: [facility] OR     teeth extraction[         Current Medications:  ---------------------------  Current Outpatient Medications   Medication Sig Dispense Refill     escitalopram (LEXAPRO) 10 MG tablet 1/2 tablet per day for one week, 1 tablet per day 30 tablet 0     LORazepam (ATIVAN) 0.5 MG tablet Take 1 tablet (0.5 mg) by mouth daily as needed (FOR ACUTE ANXIETY ONLY) 15 tablet 0       Allergies:  -------------  No Known Allergies    Social History:  -------------------  Social History     Socioeconomic History     Marital status:      Spouse name: Not on file     Number of children: Not on file     Years of education: Not on file     Highest education level: Not on file   Occupational History     Occupation:      Employer: [employer]     Comment: [employer]   Social Needs     Financial resource strain: Not on file     Food insecurity:     Worry: Not on file     Inability: Not on file     Transportation needs:     Medical: Not on file     Non-medical: Not on file   Tobacco Use     Smoking status: Current Every Day Smoker     Packs/day: 0.50     Types: Cigarettes     Smokeless tobacco: Never Used   Substance and Sexual Activity     Alcohol use: Yes     Alcohol/week: 0.0 oz     Frequency: 2-4 times a month     Drinks per session: 3 or 4     Binge frequency: Never     Comment: 2-3 drinks     Drug use: Yes     Types:  Marijuana     Comment: occ     Sexual activity: Not Currently     Partners: Male     Birth control/protection: None   Lifestyle     Physical activity:     Days per week: Not on file     Minutes per session: Not on file     Stress: Not on file   Relationships     Social connections:     Talks on phone: Not on file     Gets together: Not on file     Attends Yazidi service: Not on file     Active member of club or organization: Not on file     Attends meetings of clubs or organizations: Not on file     Relationship status: Not on file     Intimate partner violence:     Fear of current or ex partner: Not on file     Emotionally abused: Not on file     Physically abused: Not on file     Forced sexual activity: Not on file   Other Topics Concern     Parent/sibling w/ CABG, MI or angioplasty before 65F 55M? Not Asked   Social History Narrative     Not on file       Family Medical History:  ------------------------------  Family History   Problem Relation Age of Onset     Lipids Mother      Prostate Cancer Father      Lipids Father      Family History Negative Sister      Family History Negative Sister      Family History Negative Brother          ROS:  REVIEW OF SYSTEMS:    RESP: negative for cough, dyspnea, wheezing, hemoptysis  CV: negative for chest pain, palpitations, PND, DAVIS, orthopnea; reports no significant changes in their ability to perform physical activity (from cardiovascular standpoint)  GI: negative for dysphagia, N/V, pain, melena, diarrhea and constipation  NEURO: negative for new numbness/tingling, paralysis, incoordination, or focal weakness     OBJECTIVE:                                                    /78   Pulse 98   Temp 99  F (37.2  C) (Oral)   Resp 12   Wt 59.7 kg (131 lb 9.6 oz)   LMP 08/19/2019 (Exact Date)   SpO2 100%   Breastfeeding? No   BMI 21.24 kg/m       Physical exam deferred today.           ASSESSMENT/PLAN:                                                       (F43.22) Adjustment disorder with anxious mood  (primary encounter diagnosis)  Comment: Spoke at length about mood disorders including suspected pathophysiology, role of neurotransmitters, and treatment options including medication options (SSRIs that treat cause of sx and Benzos which treat the sx.) and counselling.   As he having great deal of stress in her life due to the issues related to her ex-.  Recommended that she work with some sort of advocate regarding her current issues regarding their mediation agreement from their divorce.  Recommend she consider speaking with counselors at [facility] for additional resources.  Recommend she may need to consider meeting with a  in an effort to enforce the terms of their agreement.    Will start Esitalopram in hopes of improving the symptoms related to the patient's mood disorder.  Told to take 5 mg per day for 10 days, then 10 mg daily.   Discussed the specific side effects of this medication including, but not limited to, disordered eating, sleeping, changes in sexual desire or performance, paradoxical worsening of mood, and reports of increased risk of suicide in teens.  They promised to stop the medication and call if any adverse side effects or changes in mood.   Will have the patient take Lorazepam as needed for the management of acute anxiety and/or panic regardless of the cause, known (e.g. Phobia) or unknown.    Discussed side effects of lorazepam including drowsiness, impaired cognitive and memory function.  Asked the patient not to drive after taking this or to use heavy or dangerous equipment out of concern for lorazepam affecting cognition or physical performance.'  Discusssed my philosophy with regard to the use of intermittent use of short acting benzodiazepenes.  They are used to treat the acute symptoms of anxiety/stress, but do little to treat the underlying cause.   I would interpret any increase in useage as an indicaiton that  there needs to be another choice in therapy aimed at treating the cause, e.g. a SSRI.  We reviewed the differences in half life of the different benzos and how that related to making those choices for their use in pts.  I will only rx short acting benzos for rescue situations.  If the need to use them arises more frequently, then that is a signal that something more chronic or definitive needs to be done to treat the undelrying cause of the disorder.   Plan: escitalopram (LEXAPRO) 10 MG tablet, LORazepam         (ATIVAN) 0.5 MG tablet              See Patient Instructions    Return to see me in 1 month regardless of how they feel, return sooner if needed.  Use [place] or Call [phone] to schedule the appointment with me.        MARCELA WINTER M.D., MD  [facility]    I spent greater than 25 minutes with pt, greater than 50% of time was educational and counseling.     (Chart documentation may have been completed, in part, with [employer] voice-recognition software. Even though reviewed, some grammatical, spelling, and word errors may remain.)

## 2019-08-28 NOTE — PATIENT INSTRUCTIONS
"ANXIETY:    *  Start Medication to help control and prevent the symptoms of anxiety.       --Start Escitalopram (generic Lexapro) 5 mg (1/2 of 10 mg tablet) once per day for 7 days, then 10 mg once per day.  Let us know if you experience any significant side effects, which may include disordered appetite, disordered sleeping, intestinal upset.  Specifically let us know if you experience increase anxiety or depression.      *  Call [phone] ([facility] Internal Medicine Nurse Line) with any questions or concerns about medication side effects.    *  Lorazepam 1/2 or 1 tablet as needed FOR ACUTE ANXIETY ATTACKS.  Do not drink alcohol after taking this, do not drive after taking this, do not use heavy machinery or perform dangerous tasks after taking due to the possible drowsiness.      *  Google \"CBT for panic\" and check out the book \"Mind over Mood\" for further information on other methods of managing anxiety symptoms beyond medications.     *  Visit the web site National [place] of Mental Health web site (http://www.nimh.nih.gov/index.shtml)  for more information on anxiety, depression, and other conditions     *  Return to see me in 1 month regardless of how you feel to follow up on these issues.        Resources for any acute mental health issues:    *Crisis Intervention: 783.425.7306 or 994-463-6398 (TTY: 787.322.1579).  Call anytime for help.   *[facility] Crisis: COPE: (143.604.6601) 24 hour mobile crisis support for people having a mental health crisis in [facility].    *National Suicide Prevention  7-504-754-3860  *Acute Psychiatric Services (977-049-0485). 24-hour walk-in crisis psychiatric support at [facility]; Emergency Medications Clinic available 7:30am - 2:00pm  *Crisis Connection: (156.289.9703) 24-hour confidential telephone counseling    *[facility] Emergency Room: [phone]  *National [place] on Mental Illness (www.[place].christine.org): " 607.152.4746 or 157-235-9530.  *Mental Health Consumer/Survivor Network of [place] (www.mhcsn.net): 961.210.2184 or 826-194-7915  *Mental Health Association of [place] (www.mentalhealth.org): 878.809.1495 or 932-521-4150  *Self- Management and Recovery Training., [employer]-- Toll free: 732.504.7027  www.ProtectWise.org

## 2019-09-30 ENCOUNTER — OFFICE VISIT (OUTPATIENT)
Dept: INTERNAL MEDICINE | Facility: CLINIC | Age: 39
End: 2019-09-30
Payer: COMMERCIAL

## 2019-09-30 VITALS
HEART RATE: 80 BPM | TEMPERATURE: 97.8 F | OXYGEN SATURATION: 98 % | WEIGHT: 126.5 LBS | HEIGHT: 66 IN | SYSTOLIC BLOOD PRESSURE: 124 MMHG | BODY MASS INDEX: 20.33 KG/M2 | DIASTOLIC BLOOD PRESSURE: 68 MMHG

## 2019-09-30 DIAGNOSIS — F43.22 ADJUSTMENT DISORDER WITH ANXIOUS MOOD: ICD-10-CM

## 2019-09-30 PROCEDURE — 99213 OFFICE O/P EST LOW 20 MIN: CPT | Mod: 25 | Performed by: INTERNAL MEDICINE

## 2019-09-30 PROCEDURE — 90686 IIV4 VACC NO PRSV 0.5 ML IM: CPT | Performed by: INTERNAL MEDICINE

## 2019-09-30 PROCEDURE — 90471 IMMUNIZATION ADMIN: CPT | Performed by: INTERNAL MEDICINE

## 2019-09-30 RX ORDER — ESCITALOPRAM OXALATE 10 MG/1
10 TABLET ORAL DAILY
Qty: 90 TABLET | Refills: 0 | Status: SHIPPED | OUTPATIENT
Start: 2019-09-30 | End: 2019-12-23

## 2019-09-30 ASSESSMENT — MIFFLIN-ST. JEOR: SCORE: 1275.55

## 2019-09-30 ASSESSMENT — PATIENT HEALTH QUESTIONNAIRE - PHQ9: SUM OF ALL RESPONSES TO PHQ QUESTIONS 1-9: 8

## 2019-09-30 NOTE — NURSING NOTE
"Chief Complaint   Patient presents with     Anxiety     /68   Pulse 80   Temp 97.8  F (36.6  C) (Temporal)   Ht 1.676 m (5' 6\")   Wt 57.4 kg (126 lb 8 oz)   LMP 09/16/2019 (Approximate)   SpO2 98%   BMI 20.42 kg/m   Estimated body mass index is 20.42 kg/m  as calculated from the following:    Height as of this encounter: 1.676 m (5' 6\").    Weight as of this encounter: 57.4 kg (126 lb 8 oz).  Medication Reconciliation: complete      Health Maintenance that is potentially due pending provider review:  PHQ9    Possibly completing today per provider review.    ANDRES Roach  "

## 2019-09-30 NOTE — PROGRESS NOTES
Subjective     Yvonne Sesay is a 38 year old female who presents to clinic today for the following health issues:    HPI   Anxiety Follow-Up    How are you doing with your anxiety since your last visit? Improved with Lexapro 10mg    Are you having other symptoms that might be associated with anxiety? No    Have you had a significant life event? No     Are you feeling depressed? Yes:  sometimes     Do you have any concerns with your use of alcohol or other drugs? No    Social History     Tobacco Use     Smoking status: Current Every Day Smoker     Packs/day: 0.50     Types: Cigarettes     Smokeless tobacco: Never Used   Substance Use Topics     Alcohol use: Yes     Alcohol/week: 0.0 standard drinks     Frequency: 2-4 times a month     Drinks per session: 3 or 4     Binge frequency: Never     Comment: 2-3 drinks     Drug use: Yes     Types: Marijuana     Comment: occ     No flowsheet data found.  No flowsheet data found.      Overall depression anxiety are somewhat better since starting citalopram last month.  Denies any specific side effects, no nausea no vomiting no GI symptoms.  Sleeping overall is slightly better.  Reports that her mood is relatively more stable.  Still has anxiety intermittently, has not had any panic attacks, has not had to take lorazepam.  Depression more stable.  Less crying.    Working with a therapist has helped her.    No flowsheet data found.         How many servings of fruits and vegetables do you eat daily? 1-3    On average, how many sweetened beverages do you drink each day (soda, juice, sweet tea, etc)?   2    How many days per week do you miss taking your medication? 0                  Reviewed and updated as needed this visit by Provider         Review of Systems         Objective    There were no vitals taken for this visit.  There is no height or weight on file to calculate BMI.  Physical Exam                 Past Medical History:  ---------------------------  Past Medical  History:   Diagnosis Date     MTHFR mutation (methylenetetrahydrofolate reductase) (H)      RPLS (reversible posterior leukoencephalopathy syndrome)        Past Surgical History:  ---------------------------  Past Surgical History:   Procedure Laterality Date     ------------OTHER-------------      mole removal     LAPAROSCOPIC APPENDECTOMY N/A 3/16/2018    Procedure: LAPAROSCOPIC APPENDECTOMY;  LAPAROSCOPIC APPENDECTOMY;  Surgeon: Jammie Greenwood MD;  Location: [facility] OR     teeth extraction[         Current Medications:  ---------------------------  Current Outpatient Medications   Medication Sig Dispense Refill     escitalopram (LEXAPRO) 10 MG tablet Take 1 tablet (10 mg) by mouth daily 90 tablet 0     LORazepam (ATIVAN) 0.5 MG tablet Take 1 tablet (0.5 mg) by mouth daily as needed (FOR ACUTE ANXIETY ONLY) 15 tablet 0       Allergies:  -------------  No Known Allergies    Social History:  -------------------  Social History     Socioeconomic History     Marital status:      Spouse name: Not on file     Number of children: Not on file     Years of education: Not on file     Highest education level: Not on file   Occupational History     Occupation:      Employer: [employer]     Comment: [employer]   Social Needs     Financial resource strain: Not on file     Food insecurity:     Worry: Not on file     Inability: Not on file     Transportation needs:     Medical: Not on file     Non-medical: Not on file   Tobacco Use     Smoking status: Current Every Day Smoker     Packs/day: 0.50     Types: Cigarettes     Smokeless tobacco: Never Used   Substance and Sexual Activity     Alcohol use: Yes     Alcohol/week: 0.0 standard drinks     Frequency: 2-4 times a month     Drinks per session: 3 or 4     Binge frequency: Never     Comment: 2-3 drinks     Drug use: Yes     Types: Marijuana     Comment: occ     Sexual activity: Not Currently     Partners: Male     Birth  "control/protection: None   Lifestyle     Physical activity:     Days per week: Not on file     Minutes per session: Not on file     Stress: Not on file   Relationships     Social connections:     Talks on phone: Not on file     Gets together: Not on file     Attends Church service: Not on file     Active member of club or organization: Not on file     Attends meetings of clubs or organizations: Not on file     Relationship status: Not on file     Intimate partner violence:     Fear of current or ex partner: Not on file     Emotionally abused: Not on file     Physically abused: Not on file     Forced sexual activity: Not on file   Other Topics Concern     Parent/sibling w/ CABG, MI or angioplasty before 65F 55M? Not Asked   Social History Narrative     Not on file       Family Medical History:  ------------------------------  Family History   Problem Relation Age of Onset     Lipids Mother      Prostate Cancer Father      Lipids Father      Family History Negative Sister      Family History Negative Sister      Family History Negative Brother          ROS:  REVIEW OF SYSTEMS:    RESP: negative for cough, dyspnea, wheezing, hemoptysis  CV: negative for chest pain, palpitations, PND, DAVIS, orthopnea; reports no significant changes in their ability to perform physical activity (from cardiovascular standpoint)  GI: negative for dysphagia, N/V, pain, melena, diarrhea and constipation  NEURO: negative for new numbness/tingling, paralysis, incoordination, or focal weakness     OBJECTIVE:                                                    /68   Pulse 80   Temp 97.8  F (36.6  C) (Temporal)   Ht 1.676 m (5' 6\")   Wt 57.4 kg (126 lb 8 oz)   LMP 09/16/2019 (Approximate)   SpO2 98%   BMI 20.42 kg/m       Physical exam deferred today.      Has normal grooming, normal speech, normal content.  Affect appears normal.  No crying during the interview today.         ASSESSMENT/PLAN:                                         "              (F43.22) Adjustment disorder with anxious mood  Comment: Overall improvement.  The patient reports less mood lability less anxiety.  This is been working with a therapist/counselor, which she has found helpful.  Has met with a  to review her ex-'s failure to comply with their mediation agreement, and now she has better clarity on the issue.  Plan: escitalopram (LEXAPRO) 10 MG tablet          Return to see me in 3 months.  Use [employer] or Call [phone] to schedule the appointment with me.         See Patient Instructions    MARCELA WINTER M.D., MD  [facility]    I spent greater than 15 minutes with pt , greater than 50% of time was educational and counseling.     (Chart documentation may have been completed, in part, with [employer] voice-recognition software. Even though reviewed, some grammatical, spelling, and word errors may remain.)

## 2019-09-30 NOTE — PATIENT INSTRUCTIONS
"*  Continue all medications at the same doses.  Contact your usual pharmacy if you need refills.       *  I would recommend taking Vitamin D 2000 units per day to help future bone health, help mood in the winter months, which you can get at any pharmacy and most grocery stores (the cheapest prices are at [employer] and [employer]).  We can repeat the levels the next time we do labs, there is no need to do it any earlier.     *  If you are doing well with your mood, then follow up with me via an \"e-visit\" in 2-3 months.     *  If yoau re not doing well on this dose of medicaiton, then return to see in an office appointment in 2-3 months.       "

## 2019-12-23 DIAGNOSIS — F43.22 ADJUSTMENT DISORDER WITH ANXIOUS MOOD: ICD-10-CM

## 2019-12-23 RX ORDER — ESCITALOPRAM OXALATE 10 MG/1
TABLET ORAL
Qty: 90 TABLET | Refills: 2 | Status: SHIPPED | OUTPATIENT
Start: 2019-12-23 | End: 2020-09-24

## 2019-12-23 NOTE — TELEPHONE ENCOUNTER
"Requested Prescriptions   Pending Prescriptions Disp Refills     escitalopram (LEXAPRO) 10 MG tablet [Pharmacy Med Name: ESCITALOPRAM 10 MG TABLET] 90 tablet 0     Sig: TAKE 1 TABLET BY MOUTH EVERY DAY       SSRIs Protocol Passed - 12/23/2019  3:40 AM        Passed - Recent (12 mo) or future (30 days) visit within the authorizing provider's specialty     Patient has had an office visit with the authorizing provider or a provider within the authorizing providers department within the previous 12 mos or has a future within next 30 days. See \"Patient Info\" tab in inbasket, or \"Choose Columns\" in Meds & Orders section of the refill encounter.              Passed - Medication is active on med list        Passed - Patient is age 18 or older        Passed - No active pregnancy on record        Passed - No positive pregnancy test in last 12 months          "

## 2020-09-24 DIAGNOSIS — F43.22 ADJUSTMENT DISORDER WITH ANXIOUS MOOD: ICD-10-CM

## 2020-09-24 RX ORDER — ESCITALOPRAM OXALATE 10 MG/1
TABLET ORAL
Qty: 90 TABLET | Refills: 0 | Status: SHIPPED | OUTPATIENT
Start: 2020-09-24 | End: 2020-11-13

## 2020-11-13 ENCOUNTER — OFFICE VISIT (OUTPATIENT)
Dept: INTERNAL MEDICINE | Facility: CLINIC | Age: 40
End: 2020-11-13
Payer: COMMERCIAL

## 2020-11-13 VITALS
TEMPERATURE: 99.2 F | WEIGHT: 148.1 LBS | HEIGHT: 66 IN | SYSTOLIC BLOOD PRESSURE: 108 MMHG | OXYGEN SATURATION: 99 % | HEART RATE: 70 BPM | DIASTOLIC BLOOD PRESSURE: 72 MMHG | BODY MASS INDEX: 23.8 KG/M2

## 2020-11-13 DIAGNOSIS — F43.22 ADJUSTMENT DISORDER WITH ANXIOUS MOOD: ICD-10-CM

## 2020-11-13 DIAGNOSIS — Z13.6 CARDIOVASCULAR SCREENING; LDL GOAL LESS THAN 130: ICD-10-CM

## 2020-11-13 DIAGNOSIS — R53.83 FATIGUE, UNSPECIFIED TYPE: ICD-10-CM

## 2020-11-13 DIAGNOSIS — Z00.00 ROUTINE GENERAL MEDICAL EXAMINATION AT A HEALTH CARE FACILITY: Primary | ICD-10-CM

## 2020-11-13 LAB
ERYTHROCYTE [DISTWIDTH] IN BLOOD BY AUTOMATED COUNT: 12.4 % (ref 10–15)
HCT VFR BLD AUTO: 41.9 % (ref 35–47)
HGB BLD-MCNC: 14.1 G/DL (ref 11.7–15.7)
MCH RBC QN AUTO: 30.7 PG (ref 26.5–33)
MCHC RBC AUTO-ENTMCNC: 33.7 G/DL (ref 31.5–36.5)
MCV RBC AUTO: 91 FL (ref 78–100)
PLATELET # BLD AUTO: 208 10E9/L (ref 150–450)
RBC # BLD AUTO: 4.6 10E12/L (ref 3.8–5.2)
WBC # BLD AUTO: 9.1 10E9/L (ref 4–11)

## 2020-11-13 PROCEDURE — 84443 ASSAY THYROID STIM HORMONE: CPT | Performed by: INTERNAL MEDICINE

## 2020-11-13 PROCEDURE — 90686 IIV4 VACC NO PRSV 0.5 ML IM: CPT | Performed by: INTERNAL MEDICINE

## 2020-11-13 PROCEDURE — 85027 COMPLETE CBC AUTOMATED: CPT | Performed by: INTERNAL MEDICINE

## 2020-11-13 PROCEDURE — 36415 COLL VENOUS BLD VENIPUNCTURE: CPT | Performed by: INTERNAL MEDICINE

## 2020-11-13 PROCEDURE — 80048 BASIC METABOLIC PNL TOTAL CA: CPT | Performed by: INTERNAL MEDICINE

## 2020-11-13 PROCEDURE — 90471 IMMUNIZATION ADMIN: CPT | Performed by: INTERNAL MEDICINE

## 2020-11-13 PROCEDURE — 99395 PREV VISIT EST AGE 18-39: CPT | Mod: 25 | Performed by: INTERNAL MEDICINE

## 2020-11-13 RX ORDER — ESCITALOPRAM OXALATE 10 MG/1
10 TABLET ORAL DAILY
Qty: 90 TABLET | Refills: 3 | Status: SHIPPED | OUTPATIENT
Start: 2020-11-13 | End: 2021-12-15

## 2020-11-13 ASSESSMENT — MIFFLIN-ST. JEOR: SCORE: 1364.56

## 2020-11-13 NOTE — PATIENT INSTRUCTIONS
"  Preventive Health Recommendations  Female Ages 26 - 39  Yearly exam:   See your health care provider every year in order to    Review health changes.     Discuss preventive care.      Review your medicines if you your doctor has prescribed any.    Until age 30: Get a Pap test every three years (more often if you have had an abnormal result).    After age 30: Talk to your doctor about whether you should have a Pap test every 3 years or have a Pap test with HPV screening every 5 years.   You do not need a Pap test if your uterus was removed (hysterectomy) and you have not had cancer.  You should be tested each year for STDs (sexually transmitted diseases), if you're at risk.   Talk to your provider about how often to have your cholesterol checked.  If you are at risk for diabetes, you should have a diabetes test (fasting glucose).  Shots: Get a flu shot each year. Get a tetanus shot every 10 years.   Nutrition:     Eat at least 5 servings of fruits and vegetables each day.    Eat whole-grain bread, whole-wheat pasta and brown rice instead of white grains and rice.    Get adequate Calcium and Vitamin D.        --Good Grains:  Oats, brown rice, Quinoa (these do not raise the blood sugar as much)     --Bad grains:  Anything made from wheat or white rice     (because these raise the blood sugars significantly, and the possible gluten issue from wheat for some people).      --Proteins:  Aim for \"lean proteins\" including chicken, fish, seafood, pork, turkey, and eggs (in moderation); Eat red meat only occasionally        Lifestyle    Exercise at least 150 minutes a week (30 minutes a day, 5 days of the week). This will help you control your weight and prevent disease.    Limit alcohol to one drink per day.    No smoking.     Wear sunscreen to prevent skin cancer.    See your dentist every six months for an exam and cleaning.    "

## 2020-11-13 NOTE — PROGRESS NOTES
SUBJECTIVE:   CC: Yvonne Sesay is an 39 year old woman who presents for preventive health visit.       Patient has been advised of split billing requirements and indicates understanding: Yes  Healthy Habits:     Getting at least 3 servings of Calcium per day:  Yes    Bi-annual eye exam:  Yes    Dental care twice a year:  Yes    Sleep apnea or symptoms of sleep apnea:  Daytime drowsiness    Diet:  Regular (no restrictions)    Frequency of exercise:  2-3 days/week    Duration of exercise:  30-45 minutes    Taking medications regularly:  Yes    Medication side effects:  Other    PHQ-2 Total Score: 1    Additional concerns today:  No          1.  On Escitalopram for control of chronic anxiety.  working well.  She has not experienced any significant side effects of this medication.   Wonders about how long to continue taking it.     2.  Reports generalzied fatigue over the past few months.   Denies fevers, unintended weight loss, unexplained abdominal pain, unexplained joint or muscle pain,  recent travels, or  history of autoimmune disease.   Menses slightly more irregular during this time.     Today's PHQ-2 Score:   PHQ-2 ( 1999 Pfizer) 11/13/2020   Q1: Little interest or pleasure in doing things 0   Q2: Feeling down, depressed or hopeless 1   PHQ-2 Score 1   Q1: Little interest or pleasure in doing things Not at all   Q2: Feeling down, depressed or hopeless Several days   PHQ-2 Score 1       Abuse: Current or Past (Physical, Sexual or Emotional) - No  Do you feel safe in your environment? Yes        Social History     Tobacco Use     Smoking status: Current Every Day Smoker     Packs/day: 0.50     Types: Cigarettes     Smokeless tobacco: Never Used   Substance Use Topics     Alcohol use: Yes     Alcohol/week: 0.0 standard drinks     Frequency: 2-4 times a month     Drinks per session: 3 or 4     Binge frequency: Never     Comment: 2-3 drinks         Alcohol Use 11/13/2020   Prescreen: >3 drinks/day or >7  "drinks/week? No   Prescreen: >3 drinks/day or >7 drinks/week? -   No flowsheet data found.    Reviewed orders with patient.  Reviewed health maintenance and updated orders accordingly - Yes      Mammogram not appropriate for this patient based on age.    Pertinent mammograms are reviewed under the imaging tab.  History of abnormal Pap smear: NO - age 30-65 PAP every 5 years with negative HPV co-testing recommended     Reviewed and updated as needed this visit by clinical staff  Tobacco  Allergies  Meds   Med Hx   Fam Hx          Reviewed and updated as needed this visit by Provider      Med Hx   Fam Hx           **I reviewed the information recorded in the patient's EPIC chart (including but not limited to medical history, surgical history, family history, problem list, medication list, and allergy list) and updated the information as indicated based on the patients reported information.         Review of Systems  CONSTITUTIONAL: NEGATIVE for fever, chills, change in weight  INTEGUMENTARU/SKIN: NEGATIVE for worrisome rashes, moles or lesions  EYES: NEGATIVE for vision changes or irritation  ENT: NEGATIVE for ear, mouth and throat problems  RESP: NEGATIVE for significant cough or SOB  BREAST: NEGATIVE for masses, tenderness or discharge  CV: NEGATIVE for chest pain, palpitations or peripheral edema  GI: NEGATIVE for nausea, abdominal pain, heartburn, or change in bowel habits  : NEGATIVE for unusual urinary or vaginal symptoms. Periods are regular.  MUSCULOSKELETAL: NEGATIVE for significant arthralgias or myalgia  NEURO: NEGATIVE for weakness, dizziness or paresthesias  PSYCHIATRIC: NEGATIVE for changes in mood or affect     OBJECTIVE:   /72   Pulse 70   Temp 99.2  F (37.3  C) (Temporal)   Ht 1.67 m (5' 5.75\")   Wt 67.2 kg (148 lb 1.6 oz)   LMP 10/23/2020 (Approximate)   SpO2 99%   BMI 24.09 kg/m    Physical Exam  GENERAL alert and no distress  EYES:  Normal sclera,conjunctiva, EOMI  HENT: oral " and posterior pharynx without lesions or erythema, facies symmetric  NECK: Neck supple. No LAD, without thyroidmegaly.  RESP: Clear to ausculation bilaterally without wheezes or crackles. Normal BS in all fields.  CV: RRR normal S1S2 without murmurs, rubs or gallops.  LYMPH: no cervical lymph adenopathy appreciated  MS: extremities- no gross deformities of the visible extremities noted,   EXT:  no lower extremity edema  PSYCH: Alert and oriented times 3; speech- coherent  SKIN:  No obvious significant skin lesions on visible portions of face     Diagnostic Test Results:  Labs reviewed in Epic    ASSESSMENT/PLAN:     (Z00.00) Routine general medical examination at a health care facility  (primary encounter diagnosis)  Comment: Discussed cardiac disease risk factor modification including screening, preventing, and treating hypertension, elevated lipids, diabetes, and smoking cessation.    Discussed age appropriate cancer screening recommendations as dictated by age group and past medical history.    Recommended making better food choices as often as possible, including lower carb, lower fat, lower salt diet and moderation in any alcohol intake.    Recommended maintaining regular physical activity/exercise throughout their lifetime.  Recommended safety and injury prevention (I.e. seatbelt use, safety equipment like helmets when biking, etc).    Counseling:      ATP III Guidelines  ICSI Preventive Guidelines   Plan:     (F43.22) Adjustment disorder with anxious mood  Comment: This condition is currently controlled on the current medical regimen.  Continue current therapy.   The length ofg time on Escitalopram and similar medications is dictated by the depth and breadth of the depression/anxiety symptoms over time.   continue this medication at least until spring ( never stop this Nov-March in [place])  If and when it is time to stopo the medication, reduce the dose to 5 mg daily for 2-4 weeks, then 5 mg every other  "day for 2-4 weeks, then stop  The medication can always be reinitiated if needed in the future.   Plan: escitalopram (LEXAPRO) 10 MG tablet            (R53.83) Fatigue, unspecified type  Comment: No obvious medical cause for the fatigue.  Suspect this is more situational/functional.    Benign exam.   Check labs as ordered.   Will check for routine medical cause with the labs as ordered, suspect they will return abnormal.  Will follow up any abnormal labs as indicated.  D  Nonspecific fatigue may have other causes including stress and problems at home or work, or may be associated with underlying mood disorders in general.    Other causes may include sleep apnea, chronic fatigue syndrome, post viral syndromes, fibromyalgia, connective tissue disease etc.   Plan: TSH with free T4 reflex, CBC with platelets,         Basic metabolic panel            (Z13.6) CARDIOVASCULAR SCREENING; LDL GOAL LESS THAN 130  Comment: Discussed cardiac disease risk factors and cardiac disease risk factor modification.   Plan: TSH with free T4 reflex, CBC with platelets,         Basic metabolic panel               Patient has been advised of split billing requirements and indicates understanding: Yes  COUNSELING:  Reviewed preventive health counseling, as reflected in patient instructions       Regular exercise       Healthy diet/nutrition       Vision screening       Hearing screening    Estimated body mass index is 24.09 kg/m  as calculated from the following:    Height as of this encounter: 1.67 m (5' 5.75\").    Weight as of this encounter: 67.2 kg (148 lb 1.6 oz).        She reports that she has been smoking cigarettes. She has been smoking about 0.50 packs per day. She has never used smokeless tobacco.  Tobacco Cessation Action Plan:   Information offered: Patient not interested at this time      Counseling Resources:  ATP IV Guidelines  Pooled Cohorts Equation Calculator  Breast Cancer Risk Calculator  BRCA-Related Cancer Risk " Assessment: FHS-7 Tool  FRAX Risk Assessment  ICSI Preventive Guidelines  Dietary Guidelines for Americans, 2010  USDA's MyPlate  ASA Prophylaxis  Lung CA Screening    Claudio Menon MD  [facility]

## 2020-11-15 LAB
ANION GAP SERPL CALCULATED.3IONS-SCNC: 16 MMOL/L (ref 3–14)
BUN SERPL-MCNC: 16 MG/DL (ref 7–30)
CALCIUM SERPL-MCNC: 9.5 MG/DL (ref 8.5–10.1)
CHLORIDE SERPL-SCNC: 107 MMOL/L (ref 94–109)
CO2 SERPL-SCNC: 14 MMOL/L (ref 20–32)
CREAT SERPL-MCNC: 0.85 MG/DL (ref 0.52–1.04)
GFR SERPL CREATININE-BSD FRML MDRD: 86 ML/MIN/{1.73_M2}
GLUCOSE SERPL-MCNC: 86 MG/DL (ref 70–99)
POTASSIUM SERPL-SCNC: 4.4 MMOL/L (ref 3.4–5.3)
SODIUM SERPL-SCNC: 137 MMOL/L (ref 133–144)
TSH SERPL DL<=0.005 MIU/L-ACNC: 0.62 MU/L (ref 0.4–4)

## 2021-08-18 ENCOUNTER — PATIENT OUTREACH (OUTPATIENT)
Dept: NURSING | Facility: CLINIC | Age: 41
End: 2021-08-18
Payer: COMMERCIAL

## 2021-08-18 DIAGNOSIS — Z65.9 PSYCHOSOCIAL PROBLEM: Primary | ICD-10-CM

## 2021-08-18 ASSESSMENT — ACTIVITIES OF DAILY LIVING (ADL): DEPENDENT_IADLS:: INDEPENDENT

## 2021-08-18 NOTE — LETTER
[facility] CARE COORDINATION  [facility]  [address] [address]  [phone]    August 18, 2021    Yvonne Sesay  [address]  [facility] [address]      Dear Yvonne,    I am a clinic care coordinator who works with Claudio Menon MD at [facility]. I wanted to thank you for spending the time to talk with me.      The clinic care coordination team is made up of a registered nurse,  and community health worker who understand the health care system. The goal of clinic care coordination is to help you manage your health and improve access to the health care system in the most efficient manner. The team can assist you in meeting your health care goals by providing education, coordinating services, strengthening the communication among your providers and supporting you with any resource needs.    Please feel free to contact me at [phone] with any questions or concerns. We are focused on providing you with the highest-quality healthcare experience possible and that all starts with you.     Sincerely,     DEE Mathews  Clinic Care Coordinator  [facility] and [facility]  [phone]  [email or web address]

## 2021-08-18 NOTE — LETTER
[facility] CARE COORDINATION  [facility][1]  [address] [address]  [phone]    August 18, 2021    Yvonne Sesay  [address]  [place] [address]      Dear Yvonne,    I am a clinic care coordinator who works with Claudio Menon MD at [facility]. I wanted to thank you for spending the time to talk with me.      The clinic care coordination team is made up of a registered nurse,  and community health worker who understand the health care system. The goal of clinic care coordination is to help you manage your health and improve access to the health care system in the most efficient manner. The team can assist you in meeting your health care goals by providing education, coordinating services, strengthening the communication among your providers and supporting you with any resource needs.    Please feel free to contact me at [phone] with any questions or concerns. We are focused on providing you with the highest-quality healthcare experience possible and that all starts with you.     Sincerely,     DEE Mathews  Clinic Care Coordinator  [facility] and [facility]  [phone]  [email or web address]
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
## 2021-08-18 NOTE — LETTER
[facility]  Patient Centered Plan of Care  About Me:        Patient Name:  Yvonne Sesay    YOB: 1980  Age:         40 year old   Renee MRN:    [id number] Telephone Information:  Home Phone [phone]   Mobile [phone]       Address:  [address]  [facility] [address] Email address:  [email or web address]      Emergency Contact(s)    Name Relationship Lgl Grd Work Phone Home Phone Mobile Phone   1. EFRAIN CESAR Relative   [phone]    2. JUAN CARLOS MISHRA Mother   [phone]            Primary language:  English     needed? No   [facility]:  [phone] op. 1  Other communication barriers: None  Preferred Method of Communication:  Mail  Current living arrangement: I live in a private home with family  Mobility Status/ Medical Equipment: Independent    Health Maintenance  Health Maintenance Reviewed: Up to date    My Access Plan  Medical Emergency 911   Primary Clinic Line [facility] [phone]   24 Hour Appointment Line [phone] or  [phone]) (toll-free)   24 Hour Nurse Line [phone] (toll-free)   Preferred Urgent Care [facility], [phone]   [facility]  [phone]   Preferred Pharmacy [id number] [address] [address] [facility] Crisis Line The National Suicide Prevention Lifeline at 1-838.887.8192 or 911             My Care Team Members  Patient Care Team       Relationship Specialty Notifications Start End    Claudio Menon MD PCP - General Internal Medicine  12/23/11     Phone: [phone] Pager: [phone] Fax: [phone] [address] [place] [address]    Claudio Mneon MD Assigned PCP   12/9/18     Phone: [phone] Pager: [phone] Fax: [phone] [address] [place] [address]    Sunita Miranda, VANNESA Lead  Care Coordinator   8/18/21             My Care Plans  Self Management and Treatment Plan  Goals and (Comments)  Goals        General     Mental Health Management (pt-stated)      Notes - Note created  8/18/2021  4:58 PM by Sunita Miranda LSW     Goal Statement: I will establish with a mental provider for management of my mental health symptoms within the next 3 months.   Date Goal set: August 18, 2021  Barriers: Availability of clinicians   Strengths: Strong advocate for self   Date to Achieve By: 11/18/2021  Patient expressed understanding of goal: Yes  Action steps to achieve this goal:  1. I will call resources provided to me for therapy by CC SW  2. I will call [employer] for legal support.   3. I will call the CC SW with questions or concerns.               Action Plans on File:                       Advance Care Plans/Directives Type:        My Medical and Care Information  Problem List   Patient Active Problem List   Diagnosis     CARDIOVASCULAR SCREENING; LDL GOAL LESS THAN 130     Twins     Normal vaginal delivery     Indication for care in labor or delivery     NVD (normal vaginal delivery)      Current Medications and Allergies:  See printed Medication Report.    Care Coordination Start Date: 8/18/2021   Frequency of Care Coordination: monthly   Form Last Updated: 08/18/2021

## 2021-09-07 ENCOUNTER — PATIENT OUTREACH (OUTPATIENT)
Dept: NURSING | Facility: CLINIC | Age: 41
End: 2021-09-07
Payer: COMMERCIAL

## 2021-09-07 NOTE — PROGRESS NOTES
Clinic Care Coordination Contact    CC TED Follow Up Progress Note: ANNIKA JEAN received a phone call from patient to update. CC TED and patient discussed working on finding a therapist, Noman Garcia was recommended to patient as a resource.      Assessment: Patient states she has called multiple times and left a VM for scheduling. She has not received a call back. ANNIKA JEAN offered to call Noman for patient to see if ANNIKA JEAN could assist with scheduling. Patient would like this as she would like to start therapy.     Care Gaps:    Health Maintenance Due   Topic Date Due     ADVANCE CARE PLANNING  Never done     DEPRESSION ACTION PLAN  Never done     Pneumococcal Vaccine: Pediatrics (0 to 5 Years) and At-Risk Patients (6 to 64 Years) (1 of 2 - PPSV23) Never done     HIV SCREENING  Never done     HEPATITIS C SCREENING  Never done     INFLUENZA VACCINE (1) 09/01/2021     PAP  01/01/2022       Did not discuss     Goals addressed this encounter:   Goals Addressed                    This Visit's Progress       Patient Stated       Mental Health Management (pt-stated)   10%      Goal Statement: I will establish with a mental provider for management of my mental health symptoms within the next 3 months.   Date Goal set: August 18, 2021  Barriers: Availability of clinicians   Strengths: Strong advocate for self   Date to Achieve By: 11/18/2021  Patient expressed understanding of goal: Yes  Action steps to achieve this goal:  1. I will call resources provided to me for therapy by ANNIKA JEAN  2. I will call [employer] for legal support.   3. I will call the CC TED with questions or concerns.              Intervention/Education provided during outreach: ANNIKA JEAN called Noman Garcia on behalf of patient.   Took patient's name and number and will call patient to schedule patient a therapy appointment.      Outreach Frequency: monthly    Plan:  - Patient to schedule and establish with a therapist.   Care Coordinator will follow up in  1 month.       DEE Mathews  Clinic Care Coordinator  [facility] and Camilla [facility]kin  [phone]  [email or web address]

## 2021-10-02 ENCOUNTER — HEALTH MAINTENANCE LETTER (OUTPATIENT)
Age: 41
End: 2021-10-02

## 2021-10-13 ENCOUNTER — PATIENT OUTREACH (OUTPATIENT)
Dept: NURSING | Facility: CLINIC | Age: 41
End: 2021-10-13
Payer: COMMERCIAL
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
## 2021-11-19 ENCOUNTER — PATIENT OUTREACH (OUTPATIENT)
Dept: CARE COORDINATION | Facility: CLINIC | Age: 41
End: 2021-11-19
Payer: COMMERCIAL

## 2021-11-19 NOTE — PROGRESS NOTES
Clinic Care Coordination Contact  [facility]/Voicemail       Clinical Data: Care Coordinator Outreach  Outreach attempted x 1.  Left message on patient's voicemail with call back information and requested return call.  Plan: Care Coordinator will try to reach patient again in 3-5 business days.        DEE Mathews  Clinic Care Coordinator  [facility] and [facility]  [phone]  [email or web address]

## 2021-11-19 NOTE — LETTER
ANA[facility] CARE COORDINATION  [facility]  [address] [address]  [phone]    December 20, 2021    Yvonne Sesay  [address]  [facility] [address]      Dear Yvonne,    I have been attempting to reach you since our last contact. I would like to continue to work with you and provide any additional support you may need on achieving your health care related goals. I would appreciate if you would give me a call at [phone] to let me know if you would like to continue working together. I know that there are many things that can affect our ability to communicate and I hope we can continue to work together.    All of us at the [facility] are invested in your health and are here to assist you in meeting your goals.     Sincerely,    VANNESA Pérez

## 2021-12-03 NOTE — PROGRESS NOTES
Clinic Care Coordination Contact  [facility]/Voicemail       Clinical Data: Care Coordinator Outreach  Outreach attempted x 2.  Left message on patient's voicemail with call back information and requested return call.  Plan: Care Coordinator will try to reach patient again in 10 business days.      DEE Mathews  Clinic Care Coordinator  [facility] and [facility]ett  [phone]  [email or web address]

## 2021-12-14 NOTE — PROGRESS NOTES
"   SUBJECTIVE:   CC: Yvonne Sesay is an 41 year old woman who presents for preventive health visit.       Patient has been advised of split billing requirements and indicates understanding: Yes  Healthy Habits:     Getting at least 3 servings of Calcium per day:  Yes    Bi-annual eye exam:  Yes    Dental care twice a year:  Yes    Sleep apnea or symptoms of sleep apnea:  None    Diet:  Regular (no restrictions) and Other    Frequency of exercise:  2-3 days/week    Duration of exercise:  15-30 minutes    Taking medications regularly:  Yes    Barriers to taking medications:  None    Medication side effects:  None    PHQ-2 Total Score: 1    Additional concerns today:  Yes    Pap not due til 2022      1.  ANXIETY:  Has known history of anxiety and has been on medication for this.  The patient does not report any significant side effects of this medication.  The prior symptoms leading to the original diagnosis and decision to start medication therapy are better.     Appetite is stable.  Sleeping patterns are stable.    Overall, the level of \"racing thoughts\", emotional lability, and ease of agitation are better.    No recent panic attacks.    No flowsheet data found.       Today's PHQ-2 Score:   PHQ-2 ( 1999 Pfizer) 12/15/2021   Q1: Little interest or pleasure in doing things 0   Q2: Feeling down, depressed or hopeless 1   PHQ-2 Score 1   PHQ-2 Total Score (12-17 Years)- Positive if 3 or more points; Administer PHQ-A if positive -   Q1: Little interest or pleasure in doing things Not at all   Q2: Feeling down, depressed or hopeless Several days   PHQ-2 Score 1       Abuse: Current or Past (Physical, Sexual or Emotional) - No  Do you feel safe in your environment? Yes    Have you ever done Advance Care Planning? (For example, a Health Directive, POLST, or a discussion with a medical provider or your loved ones about your wishes):     Social History     Tobacco Use     Smoking status: Current Every Day Smoker     " Packs/day: 0.50     Types: Cigarettes     Smokeless tobacco: Never Used   Substance Use Topics     Alcohol use: Yes     Alcohol/week: 0.0 standard drinks     Comment: 2-3 drinks     If you drink alcohol do you typically have >3 drinks per day or >7 drinks per week? No    Alcohol Use 12/15/2021   Prescreen: >3 drinks/day or >7 drinks/week? No   Prescreen: >3 drinks/day or >7 drinks/week? -       Reviewed orders with patient.  Reviewed health maintenance and updated orders accordingly - Yes      Breast Cancer Screening:  Any new diagnosis of family breast, ovarian, or bowel cancer? No    FHS-7: No flowsheet data found.    Mammogram Screening - Offered annual screening and updated Health Maintenance for mutual plan based on risk factor consideration    Pertinent mammograms are reviewed under the imaging tab.    History of abnormal Pap smear: NO - age 30-65 PAP every 5 years with negative HPV co-testing recommended     Reviewed and updated as needed this visit by clinical staff  Tobacco  Allergies  Meds  Problems    [facility] Hx         Reviewed and updated as needed this visit by Provider  Tobacco  Allergies  Meds  Problems    [facility]          **I reviewed the information recorded in the patient's EPIC chart (including but not limited to medical history, surgical history, family history, problem list, medication list, and allergy list) and updated the information as indicated based on the patients reported information.         Review of Systems   Constitutional: Negative for chills and fever.   HENT: Negative for congestion, ear pain, hearing loss and sore throat.    Eyes: Negative for pain and visual disturbance.   Respiratory: Negative for cough and shortness of breath.    Cardiovascular: Negative for chest pain, palpitations and peripheral edema.   Gastrointestinal: Negative for abdominal pain, constipation, diarrhea, heartburn, hematochezia and nausea.   Breasts:  Negative for tenderness, breast mass and  "discharge.   Genitourinary: Negative for dysuria, frequency, genital sores, hematuria, pelvic pain, urgency, vaginal bleeding and vaginal discharge.   Musculoskeletal: Negative for arthralgias, joint swelling and myalgias.   Skin: Negative for rash.   Neurological: Negative for dizziness, weakness, headaches and paresthesias.   Psychiatric/Behavioral: Negative for mood changes. The patient is not nervous/anxious.      CONSTITUTIONAL: NEGATIVE for fever, chills, change in weight  INTEGUMENTARU/SKIN: NEGATIVE for worrisome rashes, moles or lesions  EYES: NEGATIVE for vision changes or irritation  ENT: NEGATIVE for ear, mouth and throat problems  RESP: NEGATIVE for significant cough or SOB  BREAST: NEGATIVE for masses, tenderness or discharge  CV: NEGATIVE for chest pain, palpitations or peripheral edema  GI: NEGATIVE for nausea, abdominal pain, heartburn, or change in bowel habits  : NEGATIVE for unusual urinary or vaginal symptoms. Periods are regular.  MUSCULOSKELETAL: NEGATIVE for significant arthralgias or myalgia  NEURO: NEGATIVE for weakness, dizziness or paresthesias  PSYCHIATRIC: NEGATIVE for changes in mood or affect     OBJECTIVE:   /74   Pulse 69   Temp 97.9  F (36.6  C) (Oral)   Ht 1.676 m (5' 6\")   Wt 69.4 kg (153 lb)   LMP 12/06/2021 (Exact Date)   SpO2 97%   Breastfeeding No   BMI 24.69 kg/m    Physical Exam  GENERAL alert and no distress  EYES:  Normal sclera,conjunctiva, EOMI  HENT: oral and posterior pharynx without lesions or erythema, facies symmetric  NECK: Neck supple. No LAD, without thyroidmegaly.  RESP: Clear to ausculation bilaterally without wheezes or crackles. Normal BS in all fields.  CV: RRR normal S1S2 without murmurs, rubs or gallops.  LYMPH: no cervical lymph adenopathy appreciated  MS: extremities- no gross deformities of the visible extremities noted,   EXT:  no lower extremity edema  PSYCH: Alert and oriented times 3; speech- coherent  SKIN:  No obvious significant " "skin lesions on visible portions of face     Diagnostic Test Results:  Labs reviewed in Epic    ASSESSMENT/PLAN:     (Z00.00) Encounter for routine adult health examination without abnormal findings  (primary encounter diagnosis)  Comment: Discussed cardiac disease risk factor modification including screening, preventing, and treating hypertension, elevated lipids, diabetes, and smoking cessation.    Discussed age appropriate cancer screening recommendations as dictated by age group and past medical history.    Recommended making better food choices as often as possible, including lower carb, lower fat, lower salt diet and moderation in any alcohol intake.    Recommended maintaining regular physical activity/exercise throughout their lifetime.  Recommended safety and injury prevention (I.e. seatbelt use, safety equipment like helmets when biking, etc).    Counseling:      ATP III Guidelines  ICSI Preventive Guidelines   Plan:     (F43.22) Adjustment disorder with anxious mood  Comment: This condition is currently controlled on the current medical regimen.  Continue current therapy.   Plan: escitalopram (LEXAPRO) 10 MG tablet            (Z13.6) CARDIOVASCULAR SCREENING; LDL GOAL LESS THAN 130  Comment: Discussed cardiac disease risk factors and cardiac disease risk factor modification.   Plan:     (Z23) High priority for 2019-nCoV vaccine  Comment:   Plan: COVID-19,PF,MODERNA (18+ Yrs BOOSTER .25mL)               Patient has been advised of split billing requirements and indicates understanding: Yes  COUNSELING:  Reviewed preventive health counseling, as reflected in patient instructions       Regular exercise       Healthy diet/nutrition       Vision screening       Hearing screening       Immunizations    Vaccinated for: Covid          Estimated body mass index is 24.69 kg/m  as calculated from the following:    Height as of this encounter: 1.676 m (5' 6\").    Weight as of this encounter: 69.4 kg (153 " lb).        She reports that she has been smoking cigarettes. She has been smoking about 0.50 packs per day. She has never used smokeless tobacco.  Tobacco Cessation Action Plan:   Information offered: Patient not interested at this time      Counseling Resources:  ATP IV Guidelines  Pooled Cohorts Equation Calculator  Breast Cancer Risk Calculator  BRCA-Related Cancer Risk Assessment: FHS-7 Tool  FRAX Risk Assessment  ICSI Preventive Guidelines  Dietary Guidelines for Americans, 2010  USDA's MyPlate  ASA Prophylaxis  Lung CA Screening    Claudio Menon MD  [facility]

## 2021-12-15 ENCOUNTER — OFFICE VISIT (OUTPATIENT)
Dept: INTERNAL MEDICINE | Facility: CLINIC | Age: 41
End: 2021-12-15
Payer: COMMERCIAL

## 2021-12-15 VITALS
HEIGHT: 66 IN | BODY MASS INDEX: 24.59 KG/M2 | DIASTOLIC BLOOD PRESSURE: 74 MMHG | TEMPERATURE: 97.9 F | SYSTOLIC BLOOD PRESSURE: 108 MMHG | HEART RATE: 69 BPM | OXYGEN SATURATION: 97 % | WEIGHT: 153 LBS

## 2021-12-15 DIAGNOSIS — Z23 HIGH PRIORITY FOR 2019-NCOV VACCINE: ICD-10-CM

## 2021-12-15 DIAGNOSIS — Z00.00 ENCOUNTER FOR ROUTINE ADULT HEALTH EXAMINATION WITHOUT ABNORMAL FINDINGS: Primary | ICD-10-CM

## 2021-12-15 DIAGNOSIS — Z13.6 CARDIOVASCULAR SCREENING; LDL GOAL LESS THAN 130: ICD-10-CM

## 2021-12-15 DIAGNOSIS — F43.22 ADJUSTMENT DISORDER WITH ANXIOUS MOOD: ICD-10-CM

## 2021-12-15 PROCEDURE — 99396 PREV VISIT EST AGE 40-64: CPT | Mod: 25 | Performed by: INTERNAL MEDICINE

## 2021-12-15 PROCEDURE — 91306 COVID-19,PF,MODERNA (18+ YRS BOOSTER .25ML): CPT | Performed by: INTERNAL MEDICINE

## 2021-12-15 PROCEDURE — 0064A COVID-19,PF,MODERNA (18+ YRS BOOSTER .25ML): CPT | Performed by: INTERNAL MEDICINE

## 2021-12-15 RX ORDER — ESCITALOPRAM OXALATE 10 MG/1
10 TABLET ORAL DAILY
Qty: 90 TABLET | Refills: 3 | Status: SHIPPED | OUTPATIENT
Start: 2021-12-15 | End: 2023-01-17

## 2021-12-15 ASSESSMENT — ENCOUNTER SYMPTOMS
EYE PAIN: 0
SORE THROAT: 0
WEAKNESS: 0
COUGH: 0
DIZZINESS: 0
BREAST MASS: 0
PALPITATIONS: 0
CHILLS: 0
MYALGIAS: 0
PARESTHESIAS: 0
SHORTNESS OF BREATH: 0
HEMATURIA: 0
JOINT SWELLING: 0
HEADACHES: 0
ABDOMINAL PAIN: 0
NAUSEA: 0
DYSURIA: 0
DIARRHEA: 0
ARTHRALGIAS: 0
HEARTBURN: 0
HEMATOCHEZIA: 0
CONSTIPATION: 0
FEVER: 0
NERVOUS/ANXIOUS: 0
FREQUENCY: 0

## 2021-12-15 ASSESSMENT — MIFFLIN-ST. JEOR: SCORE: 1380.75

## 2021-12-15 NOTE — PATIENT INSTRUCTIONS
"*  Continue all medications at the same doses.  Contact your usual pharmacy if you need refills.     *  Return to see me in 1 year, sooner if needed.  Use [employer] [facility] or Call [phone] to schedule the appointment with me.     5 GOALS TO PREVENT VASCULAR DISEASE:     1.  Aggressive blood pressure control, under 130/80 ideally.  Using medications if needed.    Your blood pressure is under good control    BP Readings from Last 4 Encounters:   12/15/21 108/74   11/13/20 108/72   09/30/19 124/68   08/28/19 122/78       2.  Aggressive LDL cholesterol (\"bad cholesterol\") lowering as indicated.    Your goal is an LDL under 130 for sure, preferably under 100.  (If you have diabetes or previous vascular disease, the the LDL goals would be under 100 for sure, preferably under 70.)    New guidelines identify four high-risk groups who could benefit from statins:   *people with pre-existing heart disease, such as those who have had a heart attack;   *people ages 40 to 75 who have diabetes of any type  *patients ages 40 to 75 with at least a 7.5% risk of developing cardiovascular disease over the next decade, according to a formula described in the guidelines  *patients with the sort of super-high cholesterol that sometimes runs in families, as evidenced by an LDL of 190 milligrams per deciliter or higher    Your cholesterol levels are well controlled.    Recent Labs   Lab Test 11/28/18  1451   CHOL 146   HDL 44*   LDL 84   TRIG 92       3.  Aggressive diabetic prevention, screening and/or management.      You do not have diabetes as of the most recent blood tests.     4.  No smoking    5.  Consider daily preventative aspirin over age 50.          Preventive Health Recommendations  Female Ages 40 to 49    Yearly exam:     See your health care provider every year in order to  1. Review health changes.   2. Discuss preventive care.    3. Review your medicines if your doctor prescribed any.      Get a Pap test every 3-5  years (unless " "you have an abnormal result and your provider advises testing more often or have had a hysterectomy).      If you get Pap tests with HPV test, you only need to test every 5 years, unless you have an abnormal result. You do not need a Pap test if your uterus was removed (hysterectomy) and you have not had cancer.      You should be tested each year for STDs (sexually transmitted diseases), if you're at risk.       Ask your doctor if you should have a mammogram.      Have a colonoscopy (test for colon cancer) if someone in your family has had colon cancer or polyps before age 50.       Have a cholesterol test every 5 years.       Have a diabetes test (fasting glucose) after age 45. If you are at risk for diabetes, you should have this test every 3 years.    Shots: Get a flu shot each year. Get a tetanus shot every 10 years.     Nutrition:     Eat at least 5 servings of fruits and vegetables each day.    Eat and brown rice instead of white grains and rice.    Talk to your provider about Calcium and Vitamin D.        --Good Grains:  Oats, brown rice, Quinoa (these do not raise the blood sugar as much)     --Bad grains:  Anything made from wheat or white rice     (because these raise the blood sugars significantly, and the possible gluten issue from wheat for some people).      --Proteins:  Aim for \"lean proteins\" including chicken, fish, seafood, pork, turkey, and eggs (in moderation); Eat red meat only occasionally        Lifestyle    Exercise at least 150 minutes a week (an average of 30 minutes a day, 5 days a week). This will help you control your weight and prevent disease.    Limit alcohol to one drink per day.    No smoking.     Wear sunscreen to prevent skin cancer.    See your dentist every six months for an exam and cleaning.    "

## 2021-12-20 NOTE — PROGRESS NOTES
Clinic Care Coordination Contact  [facility]/Voicemail       Clinical Data: Care Coordinator Outreach  Outreach attempted x 3.  Left message on patient's voicemail with call back information and requested return call.  Plan: Care Coordinator will send unable to contact letter with care coordinator contact information via [employer]. Care Coordinator will do no further outreaches at this time.      DEE Mathews  Clinic Care Coordinator  [facility]  [phone]  [email or web address]

## 2022-03-01 ENCOUNTER — E-VISIT (OUTPATIENT)
Dept: INTERNAL MEDICINE | Facility: CLINIC | Age: 42
End: 2022-03-01
Payer: COMMERCIAL

## 2022-03-01 DIAGNOSIS — K04.7 DENTAL INFECTION: Primary | ICD-10-CM

## 2022-03-01 PROCEDURE — 99421 OL DIG E/M SVC 5-10 MIN: CPT | Performed by: INTERNAL MEDICINE

## 2022-03-01 RX ORDER — AMOXICILLIN 875 MG
875 TABLET ORAL 2 TIMES DAILY
Qty: 14 TABLET | Refills: 0 | Status: SHIPPED | OUTPATIENT
Start: 2022-03-01 | End: 2022-03-08

## 2022-09-03 ENCOUNTER — HEALTH MAINTENANCE LETTER (OUTPATIENT)
Age: 42
End: 2022-09-03

## 2022-09-18 ENCOUNTER — NURSE TRIAGE (OUTPATIENT)
Dept: NURSING | Facility: CLINIC | Age: 42
End: 2022-09-18

## 2022-09-18 NOTE — TELEPHONE ENCOUNTER
"Pt calling. Took 2 at home covid tests today that were both originally negative at the 15 minute rush but after an hour both of them turned positive. They were from different packages. Minor \"fall cold\" symptoms present as she is a      Advised that tests usually have a disclaimer that tests are invalid after a certain amount of time. Suggested getting a PCR test done as those can be more accurate. Pt is agreeable to plan.    Reason for Disposition    COVID-19 Testing, questions about    Additional Information    Negative: [1] Diagnosed or suspected COVID-19 AND [2] symptoms lasting 3 or more weeks    Negative: [1] COVID-19 exposure AND [2] no symptoms    Negative: COVID-19 vaccine reaction suspected (e.g., fever, headache, muscle aches) occurring 1 to 3 days after getting vaccine    Negative: COVID-19 vaccine, questions about    Negative: [1] Lives with someone known to have influenza (flu test positive) AND [2] flu-like symptoms (e.g., cough, runny nose, sore throat, SOB; with or without fever)    Negative: [1] Adult with possible COVID-19 symptoms AND [2] triager concerned about severity of symptoms or other causes    Negative: COVID-19 and breastfeeding, questions about    Protocols used: CORONAVIRUS (COVID-19) DIAGNOSED OR [id number] 1.18.2022      Alma Ramirez RN [place] September 18, 2022 2:48 PM  "

## 2023-03-22 NOTE — PROGRESS NOTES
"   SUBJECTIVE:   CC: Rebecca is an 42 year old who presents for preventive health visit.   Patient has been advised of split billing requirements and indicates understanding: Yes  Healthy Habits:     Getting at least 3 servings of Calcium per day:  Yes    Bi-annual eye exam:  Yes    Dental care twice a year:  Yes    Sleep apnea or symptoms of sleep apnea:  None    Diet:  Regular (no restrictions)    Frequency of exercise:  2-3 days/week    Duration of exercise:  30-45 minutes    Taking medications regularly:  Yes    Medication side effects:  None    PHQ-2 Total Score: 0    Additional concerns today:  No      1.    ANXIETY:  Has known history of anxiety and has been on medication for this.  The patient does not report any significant side effects of this medication.  The prior symptoms leading to the original diagnosis and decision to start medication therapy are better.     Appetite is stable.  Sleeping patterns are stable.    Overall, the level of \"racing thoughts\", emotional lability, and ease of agitation are better.    No recent panic attacks.         View : No data to display.                         Today's PHQ-2 Score:       3/23/2023     9:17 AM   PHQ-2 ( 1999 Pf[employer])   Q1: Little interest or pleasure in doing things 0   Q2: Feeling down, depressed or hopeless 0   PHQ-2 Score 0   Q1: Little interest or pleasure in doing things Not at all   Q2: Feeling down, depressed or hopeless Not at all   PHQ-2 Score 0       Social History     Tobacco Use     Smoking status: Every Day     Packs/day: 0.50     Types: Cigarettes     Smokeless tobacco: Never   Vaping Use     Vaping status: Never Used     Passive vaping exposure: Yes   Substance Use Topics     Alcohol use: Yes     Alcohol/week: 0.0 standard drinks of alcohol     Comment: 2-3 drinks             3/23/2023     9:16 AM   Alcohol Use   Prescreen: >3 drinks/day or >7 drinks/week? No     Reviewed orders with patient.  Reviewed health maintenance and updated orders " accordingly -       Breast Cancer Screenin/15/2021     8:40 AM   Breast CA Risk Assessment (FHS-7)   Do you have a family history of breast, colon, or ovarian cancer? No / Unknown         Mammogram Screening - Offered annual screening and updated Health Maintenance for [place] plan based on risk factor consideration    Pertinent mammograms are reviewed under the imaging tab.    History of abnormal Pap smear: NO - age 30-65 PAP every 5 years with negative HPV co-testing recommended     Reviewed and updated as needed this visit by clinical staff   Tobacco  Allergies  Meds     Fam Hx          Reviewed and updated as needed this visit by Provider     Meds     Fam Hx           **I reviewed the information recorded in the patient's EPIC chart (including but not limited to medical history, surgical history, family history, problem list, medication list, and allergy list) and updated the information as indicated based on the patients reported information.         Review of Systems   Constitutional: Negative for chills and fever.   HENT: Negative for congestion, ear pain, hearing loss and sore throat.    Eyes: Negative for pain and visual disturbance.   Respiratory: Negative for cough and shortness of breath.    Cardiovascular: Negative for chest pain, palpitations and peripheral edema.   Gastrointestinal: Negative for abdominal pain, constipation, diarrhea, heartburn, hematochezia and nausea.   Breasts:  Negative for tenderness, breast mass and discharge.   Genitourinary: Negative for dysuria, frequency, genital sores, hematuria, pelvic pain, urgency, vaginal bleeding and vaginal discharge.   Musculoskeletal: Negative for arthralgias, joint swelling and myalgias.   Skin: Negative for rash.   Neurological: Negative for dizziness, weakness, headaches and paresthesias.   Psychiatric/Behavioral: Negative for mood changes. The patient is not nervous/anxious.      CONSTITUTIONAL: NEGATIVE for fever, chills,  "change in weight  INTEGUMENTARU/SKIN: NEGATIVE for worrisome rashes, moles or lesions  EYES: NEGATIVE for vision changes or irritation  ENT: NEGATIVE for ear, mouth and throat problems  RESP: NEGATIVE for significant cough or SOB  BREAST: NEGATIVE for masses, tenderness or discharge  CV: NEGATIVE for chest pain, palpitations or peripheral edema  GI: NEGATIVE for nausea, abdominal pain, heartburn, or change in bowel habits  : NEGATIVE for unusual urinary or vaginal symptoms. Periods are regular.  MUSCULOSKELETAL: NEGATIVE for significant arthralgias or myalgia  NEURO: NEGATIVE for weakness, dizziness or paresthesias  PSYCHIATRIC: NEGATIVE for changes in mood or affect     OBJECTIVE:   /70   Pulse 73   Ht 1.676 m (5' 6\")   Wt 66 kg (145 lb 9.6 oz)   LMP 02/03/2023   SpO2 99%   BMI 23.50 kg/m    Physical Exam      GENERAL healthy, alert and no distress.  EYES conjunctivae/corneas clear. PERRL, EOM's intact  HENT: NCAT,oral and posterior pharynx without lesions or erythema, facies symmetric  NECK: Neck supple. No LAD, without thyroidmegaly or JVD.  RESP: Clear to ausculation bilaterally without wheezes or crackles. Normal BS in all fields.  CV: RRR normal S1S2 without  murmurs, rubs or gallops. PMI normal  LYMPH: no cervical lymph adenopathy appreciated  GI: NTND, no organomegaly, normal BS in all quadrants, without rebound or guarding  MS: No cyanosis, clubbing or edema noted bilaterally in Upper and Lower Extremities  SKIN: no ulcers, lesions or rash  NEURO: Alert and Oriented x 3, Gait normal. Reflexes normal and symmetric. Sensation grossly WNL..   BREAST:  Breasts are symmetric.  No dominant, discrete, fixed  or suspicious masses are noted.  Mild symmetric fibrocystic densities are noted in both upper outer quadrants. No skin or nipple changes or axillary nodes. Self exam is taught and encouraged. Mammogram status was reviewed with the pt and recommended if she is due.  Pelvic:  Vagina and vulva are " normal;  no discharge is noted.  Cervix normal without lesions. Uterus anteverted and mobile, normal in size and shape without tenderness.  Adnexa normal in size without masses or tenderness. Pap Smear - is completed today.     Entire physical exam chaperoned by Nurse.     Diagnostic Test Results:  Labs reviewed in Epic    ASSESSMENT/PLAN:     (Z00.00) Encounter for routine adult health examination without abnormal findings  (primary encounter diagnosis)  Comment: Discussed cardiac disease risk factor modification including screening, preventing, and treating hypertension, elevated lipids, diabetes, and smoking cessation.    Discussed age appropriate cancer screening recommendations as dictated by age group and past medical history.    Recommended making better food choices as often as possible, including lower carb, lower fat, lower salt diet and moderation in any alcohol intake.    Recommended maintaining regular physical activity/exercise throughout their lifetime.  Recommended safety and injury prevention (I.e. seatbelt use, safety equipment like helmets when biking, etc).    Counseling:      ATP III Guidelines  ICSI Preventive Guidelines   Plan: REVIEW OF HEALTH MAINTENANCE PROTOCOL ORDERS            (F43.22) Adjustment disorder with anxious mood  Comment: This condition is currently controlled on the current medical regimen.  Continue current therapy.   She has not experienced any significant side effects of this medication.   Plan: escitalopram (LEXAPRO) 10 MG tablet            (Z11.4) Screening for HIV (human immunodeficiency virus)  Comment: One time screening test per CDC recommendations.   Plan: REVIEW OF HEALTH MAINTENANCE PROTOCOL ORDERS,         HIV Antigen Antibody Combo            (Z11.59) Need for hepatitis C screening test  Comment: One time screening test per CDC recommendations.   Plan: REVIEW OF HEALTH MAINTENANCE PROTOCOL ORDERS,         Hepatitis C Screen Reflex to HCV RNA Quant and          Genotype            (Z12.4) Cervical cancer screening  Comment:   Plan: REVIEW OF HEALTH MAINTENANCE PROTOCOL ORDERS            (Z23) High priority for 2019-nCoV vaccine  Comment:   Plan: REVIEW OF HEALTH MAINTENANCE PROTOCOL ORDERS,         COVID-19,PF,MODERNA BIVALENT 18+Yrs            (Z12.31) Visit for screening mammogram  Comment:   Plan: REVIEW OF HEALTH MAINTENANCE PROTOCOL ORDERS,         *MA Screening Digital Bilateral            (Z23) Need for Tdap vaccination  Comment:   Plan: REVIEW OF HEALTH MAINTENANCE PROTOCOL ORDERS,         TDAP VACCINE (Adacel, Boostrix)            (Z13.6) CARDIOVASCULAR SCREENING; LDL GOAL LESS THAN 130  Comment: Discussed cardiac disease risk factors and cardiac disease risk factor modification.   Plan: REVIEW OF HEALTH MAINTENANCE PROTOCOL ORDERS,         CBC with platelets, Basic metabolic panel,         Lipid panel reflex to direct LDL Fasting               Patient has been advised of split billing requirements and indicates understanding: Yes      COUNSELING:  Reviewed preventive health counseling, as reflected in patient instructions       Regular exercise       Healthy diet/nutrition       Vision screening       Hearing screening       Immunizations    Vaccinated for: Hepatitis A              She reports that she has been smoking cigarettes. She has been smoking an average of .5 packs per day. She has never used smokeless tobacco.  Nicotine/Tobacco Cessation Plan:   Information offered: Patient not interested at this time          Claudio Menon MD  [facility]

## 2023-03-23 ENCOUNTER — OFFICE VISIT (OUTPATIENT)
Dept: INTERNAL MEDICINE | Facility: CLINIC | Age: 43
End: 2023-03-23
Payer: COMMERCIAL

## 2023-03-23 VITALS
HEIGHT: 66 IN | SYSTOLIC BLOOD PRESSURE: 110 MMHG | HEART RATE: 73 BPM | WEIGHT: 145.6 LBS | BODY MASS INDEX: 23.4 KG/M2 | DIASTOLIC BLOOD PRESSURE: 70 MMHG | OXYGEN SATURATION: 99 %

## 2023-03-23 DIAGNOSIS — F43.22 ADJUSTMENT DISORDER WITH ANXIOUS MOOD: ICD-10-CM

## 2023-03-23 DIAGNOSIS — Z12.4 CERVICAL CANCER SCREENING: ICD-10-CM

## 2023-03-23 DIAGNOSIS — Z11.59 NEED FOR HEPATITIS C SCREENING TEST: ICD-10-CM

## 2023-03-23 DIAGNOSIS — Z23 NEED FOR TDAP VACCINATION: ICD-10-CM

## 2023-03-23 DIAGNOSIS — Z11.4 SCREENING FOR HIV (HUMAN IMMUNODEFICIENCY VIRUS): ICD-10-CM

## 2023-03-23 DIAGNOSIS — Z00.00 ENCOUNTER FOR ROUTINE ADULT HEALTH EXAMINATION WITHOUT ABNORMAL FINDINGS: Primary | ICD-10-CM

## 2023-03-23 DIAGNOSIS — Z13.6 CARDIOVASCULAR SCREENING; LDL GOAL LESS THAN 130: ICD-10-CM

## 2023-03-23 DIAGNOSIS — Z12.31 VISIT FOR SCREENING MAMMOGRAM: ICD-10-CM

## 2023-03-23 DIAGNOSIS — Z23 HIGH PRIORITY FOR 2019-NCOV VACCINE: ICD-10-CM

## 2023-03-23 LAB
ANION GAP SERPL CALCULATED.3IONS-SCNC: 8 MMOL/L (ref 7–15)
BUN SERPL-MCNC: 12.6 MG/DL (ref 6–20)
CALCIUM SERPL-MCNC: 9.4 MG/DL (ref 8.6–10)
CHLORIDE SERPL-SCNC: 104 MMOL/L (ref 98–107)
CHOLEST SERPL-MCNC: 173 MG/DL
CREAT SERPL-MCNC: 0.88 MG/DL (ref 0.51–0.95)
DEPRECATED HCO3 PLAS-SCNC: 27 MMOL/L (ref 22–29)
ERYTHROCYTE [DISTWIDTH] IN BLOOD BY AUTOMATED COUNT: 12.8 % (ref 10–15)
GFR SERPL CREATININE-BSD FRML MDRD: 84 ML/MIN/1.73M2
GLUCOSE SERPL-MCNC: 95 MG/DL (ref 70–99)
HCT VFR BLD AUTO: 41 % (ref 35–47)
HCV AB SERPL QL IA: NONREACTIVE
HDLC SERPL-MCNC: 59 MG/DL
HGB BLD-MCNC: 13.8 G/DL (ref 11.7–15.7)
HIV 1+2 AB+HIV1 P24 AG SERPL QL IA: NONREACTIVE
LDLC SERPL CALC-MCNC: 100 MG/DL
MCH RBC QN AUTO: 30 PG (ref 26.5–33)
MCHC RBC AUTO-ENTMCNC: 33.7 G/DL (ref 31.5–36.5)
MCV RBC AUTO: 89 FL (ref 78–100)
NONHDLC SERPL-MCNC: 114 MG/DL
PLATELET # BLD AUTO: 247 10E3/UL (ref 150–450)
POTASSIUM SERPL-SCNC: 4.3 MMOL/L (ref 3.4–5.3)
RBC # BLD AUTO: 4.6 10E6/UL (ref 3.8–5.2)
SODIUM SERPL-SCNC: 139 MMOL/L (ref 136–145)
TRIGL SERPL-MCNC: 71 MG/DL
WBC # BLD AUTO: 8.3 10E3/UL (ref 4–11)

## 2023-03-23 PROCEDURE — 80048 BASIC METABOLIC PNL TOTAL CA: CPT | Performed by: INTERNAL MEDICINE

## 2023-03-23 PROCEDURE — 87389 HIV-1 AG W/HIV-1&-2 AB AG IA: CPT | Performed by: INTERNAL MEDICINE

## 2023-03-23 PROCEDURE — 36415 COLL VENOUS BLD VENIPUNCTURE: CPT | Performed by: INTERNAL MEDICINE

## 2023-03-23 PROCEDURE — 90471 IMMUNIZATION ADMIN: CPT | Performed by: INTERNAL MEDICINE

## 2023-03-23 PROCEDURE — 80061 LIPID PANEL: CPT | Performed by: INTERNAL MEDICINE

## 2023-03-23 PROCEDURE — 86803 HEPATITIS C AB TEST: CPT | Performed by: INTERNAL MEDICINE

## 2023-03-23 PROCEDURE — 99213 OFFICE O/P EST LOW 20 MIN: CPT | Mod: 25 | Performed by: INTERNAL MEDICINE

## 2023-03-23 PROCEDURE — 0134A COVID-19 VACCINE BIVALENT BOOSTER 18+ (MODERNA): CPT | Performed by: INTERNAL MEDICINE

## 2023-03-23 PROCEDURE — 91313 COVID-19 VACCINE BIVALENT BOOSTER 18+ (MODERNA): CPT | Performed by: INTERNAL MEDICINE

## 2023-03-23 PROCEDURE — 90715 TDAP VACCINE 7 YRS/> IM: CPT | Performed by: INTERNAL MEDICINE

## 2023-03-23 PROCEDURE — 85027 COMPLETE CBC AUTOMATED: CPT | Performed by: INTERNAL MEDICINE

## 2023-03-23 PROCEDURE — 99396 PREV VISIT EST AGE 40-64: CPT | Mod: 25 | Performed by: INTERNAL MEDICINE

## 2023-03-23 RX ORDER — ESCITALOPRAM OXALATE 10 MG/1
10 TABLET ORAL DAILY
Qty: 90 TABLET | Refills: 3 | Status: SHIPPED | OUTPATIENT
Start: 2023-03-23 | End: 2023-04-17

## 2023-03-23 ASSESSMENT — ENCOUNTER SYMPTOMS
BREAST MASS: 0
ABDOMINAL PAIN: 0
PARESTHESIAS: 0
HEMATURIA: 0
SORE THROAT: 0
HEMATOCHEZIA: 0
PALPITATIONS: 0
HEADACHES: 0
COUGH: 0
FREQUENCY: 0
CONSTIPATION: 0
HEARTBURN: 0
SHORTNESS OF BREATH: 0
DYSURIA: 0
NERVOUS/ANXIOUS: 0
WEAKNESS: 0
DIZZINESS: 0
EYE PAIN: 0
MYALGIAS: 0
NAUSEA: 0
JOINT SWELLING: 0
CHILLS: 0
DIARRHEA: 0
FEVER: 0
ARTHRALGIAS: 0

## 2023-03-23 NOTE — PATIENT INSTRUCTIONS
" Continue all medications at the same doses.  Contact your usual pharmacy if you need refills.      Return to see me in 1 year, sooner if needed.  Use [employer] or Call [phone] to schedule the appointment with me.         5 GOALS TO PREVENT VASCULAR DISEASE:     1.  Aggressive blood pressure control, under 130/80 ideally.  Using medications if needed.    Your blood pressure is under good control    BP Readings from Last 4 Encounters:   03/23/23 110/70   12/15/21 108/74   11/13/20 108/72   09/30/19 124/68       2.  Aggressive LDL cholesterol (\"bad cholesterol\") lowering as indicated.    Your goal is an LDL under 130 for sure, preferably under 100.  (If you have diabetes or previous vascular disease, the the LDL goals would be under 100 for sure, preferably under 70.)    New guidelines identify four high-risk groups who could benefit from statins:   *people with pre-existing heart disease, such as those who have had a heart attack;   *people ages 40 to 75 who have diabetes of any type  *patients ages 40 to 75 with at least a 7.5% risk of developing cardiovascular disease over the next decade, according to a formula described in the guidelines  *patients with the sort of super-high cholesterol that sometimes runs in families, as evidenced by an LDL of 190 milligrams per deciliter or higher    Your cholesterol levels are well controlled.    Recent Labs   Lab Test 11/28/18  1451   CHOL 146   HDL 44*   LDL 84   TRIG 92       3.  Aggressive diabetic prevention, screening and/or management.      You do not have diabetes as of the most recent blood tests.     4.  No smoking    5.  Consider daily preventative aspirin over age 50 if you have enough cardiac risk factors to place you at higher risk for the presence of vascular disease.    If you have any reason not to take aspirin such easy bruising or bleeding, stomach problems, other anticoagulant medications, or any other side effects, then you should not take " "Aspirin.     --Based on your current cardiac disease risk profile and/or age over 75, you do NOT need to take daily preventative aspirin.          Preventive Health Recommendations  Female Ages 40 to 49    Yearly exam:   See your health care provider every year in order to  Review health changes.   Discuss preventive care.    Review your medicines if your doctor prescribed any.    Get a Pap test every 3-5  years (unless you have an abnormal result and your provider advises testing more often or have had a hysterectomy).    If you get Pap tests with HPV test, you only need to test every 5 years, unless you have an abnormal result. You do not need a Pap test if your uterus was removed (hysterectomy) and you have not had cancer.    You should be tested each year for STDs (sexually transmitted diseases), if you're at risk.     Ask your doctor if you should have a mammogram.    Have a colonoscopy (test for colon cancer) if someone in your family has had colon cancer or polyps before age 50.     Have a cholesterol test every 5 years.     Have a diabetes test (fasting glucose) after age 45. If you are at risk for diabetes, you should have this test every 3 years.    Shots: Get a flu shot each year. Get a tetanus shot every 10 years.     Nutrition:   Eat at least 5 servings of fruits and vegetables each day.  Eat and brown rice instead of white grains and rice.  Talk to your provider about Calcium and Vitamin D.        --Good Grains:  Oats, brown rice, Quinoa (these do not raise the blood sugar as much)     --Bad grains:  Anything made from wheat or white rice     (because these raise the blood sugars significantly, and the possible gluten issue from wheat for some people).      --Proteins:  Aim for \"lean proteins\" including chicken, fish, seafood, pork, turkey, and eggs (in moderation); Eat red meat only occasionally        Lifestyle  Exercise at least 150 minutes a week (an average of 30 minutes a day, 5 days a week). " This will help you control your weight and prevent disease.  Limit alcohol to one drink per day.  No smoking.   Wear sunscreen to prevent skin cancer.  See your dentist every six months for an exam and cleaning.

## 2023-04-14 ENCOUNTER — TELEPHONE (OUTPATIENT)
Dept: INTERNAL MEDICINE | Facility: CLINIC | Age: 43
End: 2023-04-14
Payer: COMMERCIAL

## 2023-04-14 ENCOUNTER — NURSE TRIAGE (OUTPATIENT)
Dept: NURSING | Facility: CLINIC | Age: 43
End: 2023-04-14
Payer: COMMERCIAL

## 2023-04-14 NOTE — LETTER
April 14, 2023      Yvonne MICHEL James  [address] [address]        To Whom It May Concern:    Yvonne Ramirez had to leave work today due to acute anxiety attack.  She should be able to return to her next work shift.        Sincerely,       Claudio Menon MD

## 2023-04-14 NOTE — TELEPHONE ENCOUNTER
"Patient calling clinic to request Doctor's note for leaving work. Patient stated she needed to leave work today due to anxiety getting really bad and stated she has not seen a therapist \"in a long time\". Patient did not report any red flag symptoms.     Routing to PCP for review.   "

## 2023-04-14 NOTE — TELEPHONE ENCOUNTER
Yvonne is returning a missed call from the clinic.    Notified of msg from Dr. Menon, noted below    Yvonne would like to set up an appointmentfor Mon, 4/17/23, if possible.    Transferred to [place].    Domonique Herrera RN  [facility]

## 2023-04-14 NOTE — TELEPHONE ENCOUNTER
See Addendum to Telephone encounter on 4/14/23 with CRISPIN Menon MD, (ALVIN Salazar RN).    Domonique Herrera RN  [facility]

## 2023-04-14 NOTE — CONFIDENTIAL NOTE
The best I can do is write a note for today only.  She can access it from her [employer].    Printed copy left at [facility] desk.    Anything more than that will require a visit (virtual or in person) because if her anxiety is not controlled, then she will need to be seen.     Any forms related to this (like FMLA) would require at least virtual visit.

## 2023-04-14 NOTE — TELEPHONE ENCOUNTER
Claudio Menon MD 24 minutes ago (4:37 PM)     CS  The best I can do is write a note for today only.  She can access it from her [employer].     Printed copy left at [facility] desk.     Anything more than that will require a visit (virtual or in person) because if her anxiety is not controlled, then she will need to be seen.      Any forms related to this (like FMLA) would require at least virtual visit.      Unsigned

## 2023-04-14 NOTE — TELEPHONE ENCOUNTER
message sent    Patient Contact    Attempt # 1    Was call answered?  No.  Left message on voicemail with information to call me back.

## 2023-04-16 DIAGNOSIS — F43.22 ADJUSTMENT DISORDER WITH ANXIOUS MOOD: ICD-10-CM

## 2023-04-17 ENCOUNTER — VIRTUAL VISIT (OUTPATIENT)
Dept: INTERNAL MEDICINE | Facility: CLINIC | Age: 43
End: 2023-04-17
Payer: COMMERCIAL

## 2023-04-17 DIAGNOSIS — F43.22 ADJUSTMENT DISORDER WITH ANXIOUS MOOD: ICD-10-CM

## 2023-04-17 DIAGNOSIS — F41.0 PANIC ATTACK: ICD-10-CM

## 2023-04-17 DIAGNOSIS — F43.0 ACUTE REACTION TO STRESS: ICD-10-CM

## 2023-04-17 DIAGNOSIS — F43.10 PTSD (POST-TRAUMATIC STRESS DISORDER): Primary | ICD-10-CM

## 2023-04-17 PROCEDURE — 99214 OFFICE O/P EST MOD 30 MIN: CPT | Mod: 95 | Performed by: INTERNAL MEDICINE

## 2023-04-17 RX ORDER — LORAZEPAM 0.5 MG/1
0.5 TABLET ORAL DAILY PRN
Qty: 15 TABLET | Refills: 0 | Status: SHIPPED | OUTPATIENT
Start: 2023-04-17

## 2023-04-17 RX ORDER — ESCITALOPRAM OXALATE 10 MG/1
10 TABLET ORAL DAILY
Qty: 90 TABLET | Refills: 1 | Status: SHIPPED | OUTPATIENT
Start: 2023-04-17 | End: 2024-04-24

## 2023-04-17 ASSESSMENT — ANXIETY QUESTIONNAIRES
6. BECOMING EASILY ANNOYED OR IRRITABLE: SEVERAL DAYS
7. FEELING AFRAID AS IF SOMETHING AWFUL MIGHT HAPPEN: SEVERAL DAYS
GAD7 TOTAL SCORE: 9
GAD7 TOTAL SCORE: 9
IF YOU CHECKED OFF ANY PROBLEMS ON THIS QUESTIONNAIRE, HOW DIFFICULT HAVE THESE PROBLEMS MADE IT FOR YOU TO DO YOUR WORK, TAKE CARE OF THINGS AT HOME, OR GET ALONG WITH OTHER PEOPLE: SOMEWHAT DIFFICULT
5. BEING SO RESTLESS THAT IT IS HARD TO SIT STILL: SEVERAL DAYS
3. WORRYING TOO MUCH ABOUT DIFFERENT THINGS: SEVERAL DAYS
2. NOT BEING ABLE TO STOP OR CONTROL WORRYING: SEVERAL DAYS
1. FEELING NERVOUS, ANXIOUS, OR ON EDGE: MORE THAN HALF THE DAYS

## 2023-04-17 ASSESSMENT — PATIENT HEALTH QUESTIONNAIRE - PHQ9
SUM OF ALL RESPONSES TO PHQ QUESTIONS 1-9: 10
5. POOR APPETITE OR OVEREATING: MORE THAN HALF THE DAYS
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
## 2023-04-17 NOTE — PATIENT INSTRUCTIONS
Continue the Escitalopram at the same dose.    Take Lorazepam 1/2 or 1 tablet twice per day as needed for ACUTE anxiety or panic attack only.   Do not drink alcohol after taking this, do not drive after taking this, do not use heavy machinery or perform dangerous tasks after taking due to the possible drowsiness.      Due to the depth and breadth of her current symptoms, I agree that temporarily time away from work will be necessary in order to achieve the appropriate healing and improvement in this anxiety.     I will fill out FMLA form ( and the eventual short term disability form) to give you time off to manage these issues.  Usually this process takes several weeks.     I strongly recommend working with a therapist/counselor to help discuss ways to help manage your situation, especially dealing with issues from the past.       Referral placed to our [facility] network who will contact you and try to match you with someone if possible, however you may also need to check other places for counseling if we cannot get you in to see someone.  (See list of other psychology referral locations below)    Follow up with me regardless of how you feel in approximately 6-7 weeks, either in person or via virtual video visit.         PSYCHOLOGY REFERRAL OPTIONS:     [facility]  (psychology and psychiatry)  [email or web address]  --[place] [phone]  --[place]  [phone]  --[place]  [phone]        [facility]   ([email or web address])  --[place] ([address] [place] [address])  [phone]  --[place]  ([address])  [phone]        [facility]  Counseling & mental health  [address], [address], [place]   [phone]     [place] [facility]  Counseling & mental health  [address], [place]   [phone]     [facility]  Counseling & mental health  [address], [place]    [phone]     [facility]  [address], [place]   [phone]     [facility]   [address] [facility] [phone]            MENTAL HEALTH CRISIS RESOURCES:  For a emergency help, please call 911 or go to the nearest Emergency Department.      Emergency Walk-In Options:   *EmPATH Unit @ [facility] ([place]): [phone] - Specialized mental health emergency area designed to be calming  *[facility] ([place]): [phone]  *[facility] ([place]): [phone]  *[facility] ([place]): [phone]     [facility] Crisis Information:   *[place]: [phone]  *[place]: [phone]  *[place] ([place]ULI) - Adult: [phone]       Child: [phone]  *[place] - Adult: [phone]     Child: [phone]  *[place]: [phone]  List of all [facility] resources:   https://[place].gov/dhs/people-we-serve/adults/health-care/mental-health/resources/crisis-contacts.jsp     National Crisis Information:   *National Suicide & Crisis Lifeline: Call 988        For online chat options, visit https://suicidepreventionlifeline.org/chat/    *Crisis Intervention: 851.817.3514 or 383-679-9911 (TTY: 938.342.4430).  Call anytime for help.     *Crisis text line: Text \"START\" to 774-978   Free - confidential - 24 hours per day/7 days per week    *Poison Control Center: 1-341.196.3121  *Trans Lifeline: 1-620.733.7379 - Hotline for transgender people of all ages  *The [employer] Project: 1-617.780.8878 - Hotline for LGBT youth      For Non-Emergency Support::  Fast Tracker: Mental Health & Substance Use Disorder Resources -   https://www.[employer]n.org/     *MARGE [place] ([place] on [place] Illness):  phone: 598.274.5528  toll free: 1.703.MARGE.HELPS  fax: [phone]  email: [email or web address]  **Call or visit website for information on support groups for individuals or families.     *Self- Management " and Recovery Training., SMART-- Toll free: [phone]  www.Digiscend.org

## 2023-05-18 NOTE — PATIENT INSTRUCTIONS
Ok for this   Proper skin care from [facility]:    -Eliminate harsh soaps as they strip the natural oils from the skin, often resulting in dry itchy skin ( i.e. Dial, Zest, Tiffanie Spring)  -Use mild soaps such as Cetaphil or Dove Sensitive Skin in the shower. You do not need to use soap on arms, legs, and trunk every time you shower unless visibly soiled.   -Avoid hot or cold showers.  -After showering, lightly dry off and apply moisturizing within 2-3 minutes. This will help trap moisture in the skin.   -Aggressive use of a moisturizer at least 1-2 times a day to the entire body (including -Vanicream, Cetaphil, Aquaphor or Cerave) and moisturize hands after every washing.  -We recommend using moisturizers that come in a tub that needs to be scooped out, not a pump. This has more of an oil base. It will hold moisture in your skin much better than a water base moisturizer. The above recommended are non-pore clogging.    Wear a sunscreen with at least SPF 30 on your face, ears, neck and V of the chest daily. Wear sunscreen on other areas of the body if those areas are exposed to the sun throughout the day. Sunscreens can contain physical and/or chemical blockers. Physical blockers are less likely to clog pores, these include zinc oxide and titanium dioxide. Reapply every two hour and after swimming. Sunscreen examples include Neutrogena, CeraVe, Blue Lizard, Elta MD and many others.    UV radiation  UVA radiation remains constant throughout the day and throughout the year. It is a longer wavelength than UVB and therefore penetrates deeper into the skin leading to immediate and delayed tanning, photoaging, and skin cancer. 70-80% of UVA and UVB radiation occurs between the hours of 10am-2pm.  UVB radiation  UVB radiation causes the most harmful effects and is more significant during the summer months. However, snow and ice can reflect UVB radiation leading to skin damage during the winter months as well. UVB radiation is  responsible for tanning, burning, inflammation, delayed erythema (pinkness), pigmentation (brown spots), and skin cancer.   Just because you do not burn or are not developing a tan does not mean that you are not damaging your skin. A 15 minute drive to and from work for 30 years an lead to chronic sun damage of the skin. It is important to wear a broad spectrum (both UVA and UVB) sunscreen EVERY day with at least 30 SPF. Apply to face, ears, neck and v of the chest as this is where most of our sun exposure is. Reapply sunscreen every two hours if you plan on being outside.   Dash Paul. Clinical Dermatology: A Color Guide to Diagnosis and Therapy. Elsevier, 2016.                Wound Care Instructions     FOR SUPERFICIAL WOUNDS     [facility] [phone]    [facility] [phone]      AFTER 24 HOURS YOU SHOULD REMOVE THE BANDAGE AND BEGIN DAILY DRESSING CHANGES AS FOLLOWS:     1) Remove Dressing.     2) Clean and dry the area with tap water using a Q-tip or sterile gauze pad.     3) Apply Vaseline, Aquaphor, Polysporin ointment or Bacitracin ointment over entire wound.  Do NOT use Neosporin ointment.     4) Cover the wound with a band-aid, or a sterile non-stick gauze pad and micropore paper tape      REPEAT THESE INSTRUCTIONS AT LEAST ONCE A DAY UNTIL THE WOUND HAS COMPLETELY HEALED.    It is an old wives tale that a wound heals better when it is exposed to air and allowed to dry out. The wound will heal faster with a better cosmetic result if it is kept moist with ointment and covered with a bandage.    **Do not let the wound dry out.**      Supplies Needed:      *Cotton tipped applicators (Q-tips)    *Polysporin Ointment or Bacitracin Ointment (NOT NEOSPORIN)    *Band-aids or non-stick gauze pads and micropore paper tape.      PATIENT INFORMATION:    During the healing process you will notice a number of changes. All wounds develop a small halo of redness surrounding the wound.   This means healing is occurring. Severe itching with extensive redness usually indicates sensitivity to the ointment or bandage tape used to dress the wound.  You should call our office if this develops.      Swelling  and/or discoloration around your surgical site is common, particularly when performed around the eye.    All wounds normally drain.  The larger the wound the more drainage there will be.  After 7-10 days, you will notice the wound beginning to shrink and new skin will begin to grow.  The wound is healed when you can see skin has formed over the entire area.  A healed wound has a healthy, shiny look to the surface and is red to dark pink in color to normalize.  Wounds may take approximately 4-6 weeks to heal.  Larger wounds may take 6-8 weeks.  After the wound is healed you may discontinue dressing changes.    You may experience a sensation of tightness as your wound heals. This is normal and will gradually subside.    Your healed wound may be sensitive to temperature changes. This sensitivity improves with time, but if you re having a lot of discomfort, try to avoid temperature extremes.    Patients frequently experience itching after their wound appears to have healed because of the continue healing under the skin.  Plain Vaseline will help relieve the itching.    POSSIBLE COMPLICATIONS    BLEEDIN. Leave the bandage in place.  2. Use tightly rolled up gauze or a cloth to apply direct pressure over the bandage for 30  minutes.  3. Reapply pressure for an additional 30 minutes if necessary  4. Use additional gauze and tape to maintain pressure once the bleeding has stopped.

## 2023-06-12 ENCOUNTER — VIRTUAL VISIT (OUTPATIENT)
Dept: INTERNAL MEDICINE | Facility: CLINIC | Age: 43
End: 2023-06-12
Payer: COMMERCIAL

## 2023-06-12 DIAGNOSIS — F43.22 ADJUSTMENT DISORDER WITH ANXIOUS MOOD: Primary | ICD-10-CM

## 2023-06-12 DIAGNOSIS — F41.0 PANIC ATTACK: ICD-10-CM

## 2023-06-12 DIAGNOSIS — F43.10 PTSD (POST-TRAUMATIC STRESS DISORDER): ICD-10-CM

## 2023-06-12 PROCEDURE — 99213 OFFICE O/P EST LOW 20 MIN: CPT | Mod: VID | Performed by: INTERNAL MEDICINE

## 2023-06-12 ASSESSMENT — ANXIETY QUESTIONNAIRES
8. IF YOU CHECKED OFF ANY PROBLEMS, HOW DIFFICULT HAVE THESE MADE IT FOR YOU TO DO YOUR WORK, TAKE CARE OF THINGS AT HOME, OR GET ALONG WITH OTHER PEOPLE?: SOMEWHAT DIFFICULT
GAD7 TOTAL SCORE: 9
4. TROUBLE RELAXING: MORE THAN HALF THE DAYS
1. FEELING NERVOUS, ANXIOUS, OR ON EDGE: SEVERAL DAYS
GAD7 TOTAL SCORE: 9
2. NOT BEING ABLE TO STOP OR CONTROL WORRYING: SEVERAL DAYS
5. BEING SO RESTLESS THAT IT IS HARD TO SIT STILL: SEVERAL DAYS
7. FEELING AFRAID AS IF SOMETHING AWFUL MIGHT HAPPEN: MORE THAN HALF THE DAYS
6. BECOMING EASILY ANNOYED OR IRRITABLE: SEVERAL DAYS
7. FEELING AFRAID AS IF SOMETHING AWFUL MIGHT HAPPEN: MORE THAN HALF THE DAYS
IF YOU CHECKED OFF ANY PROBLEMS ON THIS QUESTIONNAIRE, HOW DIFFICULT HAVE THESE PROBLEMS MADE IT FOR YOU TO DO YOUR WORK, TAKE CARE OF THINGS AT HOME, OR GET ALONG WITH OTHER PEOPLE: SOMEWHAT DIFFICULT
3. WORRYING TOO MUCH ABOUT DIFFERENT THINGS: SEVERAL DAYS

## 2023-06-12 NOTE — PROGRESS NOTES
Rebecca is a 42 year old who is being evaluated via a billable video visit.      How would you like to obtain your AVS? MyChart  If the video visit is dropped, the invitation should be resent by:   Will anyone else be joining your video visit? No         (F43.22) Adjustment disorder with anxious mood  (primary encounter diagnosis)  Comment: Improved since last visit.  Her symptoms associated with anxiety and PTSD and panic have subsided noticeably.  She is only use the lorazepam on demand 1 time.  Continue daily escitalopram at the same dose.  Use lorazepam if needed for acute panic or anxiety.  Strongly recommend continuing to work with a therapist/counselor in addition to the current medications.    Plan:     (F43.10) PTSD (post-traumatic stress disorder)  Comment: Improving.  Continue work with a counselor.   Plan:     (F41.0) Panic attack  Comment: no further panic attacks.   The escitalopram counseling should help.   OK to use lroazepam if needed for acute panic.   Plan:      Follow-up via virtual video visit in October 2023, regardless how she feels to refill medicines and adjust therapy as indicated.    Subjective   Rebecca is a 42 year old, presenting for the following health issues:  Forms (Follow up FMLA)        4/17/2023     5:26 PM   Additional Questions   Roomed by Amanda MARTINEZ CMA     History of Present Illness       Mental Health Follow-up:  Patient presents to follow-up on Depression & Anxiety.Patient's depression since last visit has been:  Better  The patient is not having other symptoms associated with depression.  Patient's anxiety since last visit has been:  Better  The patient is not having other symptoms associated with anxiety.  Any significant life events: relationship concerns and job concerns  Patient is not feeling anxious or having panic attacks.  Patient has no concerns about alcohol or drug use.She consumes 2 sweetened beverage(s) daily. She exercises with enough effort to increase her  "heart rate 6 days per week.   She is taking medications regularly.  Today's LATOYA-7 Score: 9     Follow-up anxiety/PTSD/panic attack.  Follow up FMLA forms.    Since the last visit in April, the patient reports that her mood overall is improved.  Still has anxiety and apprehension however she reports she is doing \"much better\".  Used lorazepam only once, she felt comfortable having it available however.  She feels that she can concentrate and focus better.  For example, she had to stop numerous times during travel to visit her parents in [place] during May, having to stop every 1 hour during the drive \"to chill out\".   She made the same drive a couple weeks ago and was able to complete it all the way through having to stop only specifically for bathroom breaks.  The stress at home with kids just starting summer vacation from school has been manageable and not affecting her.  No side effects with the escitalopram.  She has begun working on self-care with regular meditation and yoga, which is helped with mood stabilization.  She has had a couple visits with a counselor through telehealth.  But still would like to get a more consistent counselor and is still working on referral options.  Unfortunately our [employer] system was unable to get her match with somebody in a timely manner.  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        **I reviewed the information recorded in the patient's EPIC chart (including but not limited to medical history, surgical history, family history, problem list, medication list, and allergy list) and updated the information as indicated based on the patients reported information.           Review of Systems   Constitutional, HEENT, cardiovascular, pulmonary, gi and gu systems are negative, except as otherwise " noted.      Objective           Vitals:  No vitals were obtained today due to virtual visit.    Physical Exam   GENERAL: Healthy, alert and no distress  EYES: Eyes grossly normal to inspection.  No discharge or erythema, or obvious scleral/conjunctival abnormalities.  RESP: No audible wheeze, cough, or visible cyanosis.  No visible retractions or increased work of breathing.    SKIN: Visible skin clear. No significant rash, abnormal pigmentation or lesions.  NEURO: Cranial nerves grossly intact.  Mentation and speech appropriate for age.  PSYCH: Mentation appears normal, affect normal/bright, judgement and insight intact, normal speech and appearance well-groomed.                Video-Visit Details    Type of service:  Video Visit     Originating Location (pt. Location): Home  Distant Location (provider location):  On-site  Platform used for Video Visit: Spencer

## 2023-06-12 NOTE — PATIENT INSTRUCTIONS
Continue all medications at the same doses.  Contact your usual pharmacy if you need refills.      I filled out the FMLA forms to have your FMLA leave running through 6/14/23.  Let me know if you need any specific forms filled out to return to your duties at the end of this leave.      I still recommend that you continue to work with a counselor to provide additional help navigating your issues.      Follow up with me in October 2023 regardless of how you feel to make sure that the school year is starting out right.   We can refill your medications at that time.

## 2024-02-17 ENCOUNTER — HEALTH MAINTENANCE LETTER (OUTPATIENT)
Age: 44
End: 2024-02-17

## 2024-04-08 ENCOUNTER — PATIENT OUTREACH (OUTPATIENT)
Dept: CARE COORDINATION | Facility: CLINIC | Age: 44
End: 2024-04-08
Payer: COMMERCIAL

## 2024-04-22 ENCOUNTER — PATIENT OUTREACH (OUTPATIENT)
Dept: CARE COORDINATION | Facility: CLINIC | Age: 44
End: 2024-04-22
Payer: COMMERCIAL

## 2024-04-24 DIAGNOSIS — F43.22 ADJUSTMENT DISORDER WITH ANXIOUS MOOD: ICD-10-CM

## 2024-04-24 RX ORDER — ESCITALOPRAM OXALATE 10 MG/1
10 TABLET ORAL DAILY
Qty: 90 TABLET | Refills: 0 | Status: SHIPPED | OUTPATIENT
Start: 2024-04-24 | End: 2024-07-26

## 2024-04-24 NOTE — TELEPHONE ENCOUNTER
Medication filled one time only as pt is due for a follow-up for further refills.  Pharmacy has been notified to inform patient to call clinic and schedule appointment.    Yesenia Anton RN

## 2024-05-23 NOTE — PROGRESS NOTES
"General Surgery Progress Note    Subjective: pt doing well. Minimal pain.     Objective: /71  Pulse 99  Temp 96  F (35.6  C) (Oral)  Resp 16  Ht 5' 6\" (1.676 m)  Wt 134 lb 8 oz (61 kg)  SpO2 97%  BMI 21.71 kg/m2  Gen: alert, no distress  CV: RRR  Pulm: nonlabored breathing  Abd: soft, tender near incisions  Ext: WWP    Assessment/Plan:   Yvonne Sesay  is a 37 year old female POD1 s/p lap appendectomy for uncomplicated appendicitis. Pt doing well.   - dc home today  - letter given for work     Jammie Greenwood MD  [facility]  [phone]              " None

## 2024-07-06 ENCOUNTER — HEALTH MAINTENANCE LETTER (OUTPATIENT)
Age: 44
End: 2024-07-06

## 2024-07-25 DIAGNOSIS — F43.22 ADJUSTMENT DISORDER WITH ANXIOUS MOOD: ICD-10-CM

## 2024-07-26 RX ORDER — ESCITALOPRAM OXALATE 10 MG/1
10 TABLET ORAL DAILY
Qty: 90 TABLET | Refills: 0 | Status: SHIPPED | OUTPATIENT
Start: 2024-07-26

## 2024-11-10 DIAGNOSIS — F43.22 ADJUSTMENT DISORDER WITH ANXIOUS MOOD: ICD-10-CM

## 2024-11-11 RX ORDER — ESCITALOPRAM OXALATE 10 MG/1
10 TABLET ORAL DAILY
Qty: 90 TABLET | Refills: 0 | OUTPATIENT
Start: 2024-11-11

## 2024-12-06 NOTE — ED NOTES
[facility]  ED Nurse Handoff Report    ED Chief complaint: Abdominal Pain (pt sts rlq pain started last night around 5p. pt has had n/v during the night)      ED Diagnosis:   Final diagnoses:   Acute appendicitis with localized peritonitis       Code Status: Full Code    Allergies: No Known Allergies    Activity level - Baseline/Home:  Independent    Activity Level - Current:   Independent     Needed?: No    Isolation: No  Infection: Not Applicable    Bariatric?: No    Vital Signs:   Vitals:    03/16/18 1210   BP: 125/77   Pulse: 99   Resp: 16   Temp: 98.3  F (36.8  C)   TempSrc: Oral   SpO2: 100%   Weight: 61.2 kg (135 lb)       Cardiac Rhythm: ,        Pain level: 0-10 Pain Scale: 2    Is this patient confused?: No     Patient Report: Initial Complaint: Patient c/o abdominal pain, N&V that started yesterday around 1900.   Focused Assessment: RLQ pain, tender to palp  Tests Performed: labs, UA, CT  Abnormal Results: CT- appy  Treatments provided: 1L NS, IV zosyn    Family Comments: family at bedside    OBS brochure/video discussed/provided to patient: Yes    ED Medications:   Medications   Saline Flush (60 mLs Intravenous Given 3/16/18 1426)   iopamidol (ISOVUE-370) solution 68 mL (68 mLs Intravenous Given 3/16/18 1425)   piperacillin-tazobactam (ZOSYN) 3.375 g vial to attach to  mL bag (3.375 g Intravenous New Bag 3/16/18 1528)   0.9% sodium chloride BOLUS (0 mLs Intravenous Stopped 3/16/18 1602)       Drips infusing?:  No    For the majority of the shift this patient was Green.   Interventions performed were n/a.    Severe Sepsis OR Septic Shock Diagnosis Present: No      ED NURSE PHONE NUMBER: *[phone]          observes rules/reads

## 2024-12-16 ENCOUNTER — VIRTUAL VISIT (OUTPATIENT)
Dept: INTERNAL MEDICINE | Facility: CLINIC | Age: 44
End: 2024-12-16
Payer: COMMERCIAL

## 2024-12-16 DIAGNOSIS — Z72.0 TOBACCO ABUSE: ICD-10-CM

## 2024-12-16 DIAGNOSIS — F43.22 ADJUSTMENT DISORDER WITH ANXIOUS MOOD: Primary | ICD-10-CM

## 2024-12-16 PROCEDURE — G2211 COMPLEX E/M VISIT ADD ON: HCPCS | Mod: 95 | Performed by: INTERNAL MEDICINE

## 2024-12-16 PROCEDURE — 99213 OFFICE O/P EST LOW 20 MIN: CPT | Mod: 95 | Performed by: INTERNAL MEDICINE

## 2024-12-16 RX ORDER — VARENICLINE TARTRATE 0.5 (11)-1
KIT ORAL
Qty: 53 TABLET | Refills: 0 | Status: SHIPPED | OUTPATIENT
Start: 2024-12-16

## 2024-12-16 RX ORDER — ESCITALOPRAM OXALATE 10 MG/1
10 TABLET ORAL DAILY
Qty: 90 TABLET | Refills: 3 | Status: SHIPPED | OUTPATIENT
Start: 2024-12-16

## 2024-12-16 ASSESSMENT — PATIENT HEALTH QUESTIONNAIRE - PHQ9
SUM OF ALL RESPONSES TO PHQ QUESTIONS 1-9: 2
5. POOR APPETITE OR OVEREATING: SEVERAL DAYS

## 2024-12-16 ASSESSMENT — ANXIETY QUESTIONNAIRES
7. FEELING AFRAID AS IF SOMETHING AWFUL MIGHT HAPPEN: NOT AT ALL
6. BECOMING EASILY ANNOYED OR IRRITABLE: SEVERAL DAYS
IF YOU CHECKED OFF ANY PROBLEMS ON THIS QUESTIONNAIRE, HOW DIFFICULT HAVE THESE PROBLEMS MADE IT FOR YOU TO DO YOUR WORK, TAKE CARE OF THINGS AT HOME, OR GET ALONG WITH OTHER PEOPLE: NOT DIFFICULT AT ALL
1. FEELING NERVOUS, ANXIOUS, OR ON EDGE: SEVERAL DAYS
2. NOT BEING ABLE TO STOP OR CONTROL WORRYING: NOT AT ALL
GAD7 TOTAL SCORE: 3
GAD7 TOTAL SCORE: 3
5. BEING SO RESTLESS THAT IT IS HARD TO SIT STILL: NOT AT ALL
3. WORRYING TOO MUCH ABOUT DIFFERENT THINGS: NOT AT ALL

## 2024-12-16 NOTE — PATIENT INSTRUCTIONS
Continue all medications at the same doses.  Contact your usual pharmacy if you need refills.      Plan to get the annual influenza vaccine each fall for sure by the end of October for the best protection throughout the winter flu season.   Get this from any pharmacy or flu shot clinic.       Plan to get an updated Covid booster each fall at least by the end of October for the best protection throughout the winter season.   Get this from any pharmacy or Covid vaccine clinic.             SMOKING CESSATION:     *  Consider contacting [place] [employer] toll-free number (1-800-QUIT-NOW) for options for support in quitting smoking.     [place] [employer]  1-800-QUIT-NOW  https://www.quitpartnermn.com/    * Set a quit date when you will no longer smoke.    *  Get something for your hands to do when you are relaxing.  Examples may be a rubber band around your wrist, pen to click, poker chip, silver dollar, or  strength ball.  This will keep your hands occupied when you would have normally been holding a cigarette.    *  Have something to put in your mouth ( preferably something healthy).  The oral fixation in smokers can be a difficult thing to get over.  Use gum, [place], Dum Dum suckers, carrots, celery sticks.  try to avoid the urge to snack or est junk food.  This will help prevent the weight gain that many people who quit smoking can experience.        2 medication options for quitting smoking aids:    ------------------------------------------------------------------    Option #1:  Consider Chantix.  Visit Chantix web site (www.[employer].com) for more details about the medicatino and how it works, and even for coupons.  The nicotine withdrawal is managed via the mechanism by which Chantix works, you do not need nicotine replacement while taking this.   The main side effects include weird or strange dreams, stomach upset, and potential adverse changes in the mood, including worsening of depression  or anxiety.  There have even been reports of suicides caused by the worsening in the depression.  If you take Chantix and develop any adverse changes in your mood or become more depressed or more anxious, then you should stop the medicatioon immediately and contact us.  Any side effects from Chantix usually resolve very quickly.      *  If you start Chantix, I will prescribe the first month, then ask that you contact me with an update after the first month, regardless of how you feel to see how the medication is working for you and to make sure no side effects.  If the medication is working well and there are no side effects, then I will continue the medication.     Option #2:   *  Start Nicotine replacement with either gum or lozenges starting the first day of not smoking.  Slowly decrease the amount of nicotine replacement over a few weeks until you no longer need it.  your body will adjust gradually to requiring no nicotine at all.  The first 1-2 weeks are the toughest as your body gets used to not having nicotine around.     Consider nicotine replacement, either lozenges or gum.  Plan to chew 4-6 pieces per day for the first week, then slowly reduce each week until done.     *  Start Bupropion (geenric Wellbutrin or Zyban) 150 mg once per day in the morning for 10 days, then increae to 300 mg (2 x 150 mg) once per day in the morning. .    *Potential side effects including but not limited to seizure (1 out of 1000 patients on Zyban may experience a seizure), GI upset, insomnia, headache, weight loss.  If your experience side effects while in Zyban/Wellbutrin, stop it an contact our clinic.     *  Contact me in one month for a refill.  I need to here that it is helping and not causing side effects, then I will provide refills.     *  Medication for stopping smoking are usually used for 3 months up to 6 months

## 2024-12-16 NOTE — PROGRESS NOTES
Rebecca is a 44 year old who is being evaluated via a billable video visit.    How would you like to obtain your AVS? MyChart  If the video visit is dropped, the invitation should be resent by: Text to cell phone: [phone]  Will anyone else be joining your video visit? No      Assessment & Plan     (F43.22) Adjustment disorder with anxious mood  (primary encounter diagnosis)  Comment: Doing well on current medication.  No side effects reported.  Continue same dose of same medicine.  Has not had to use lorazepam for over a year.  Plan: escitalopram (LEXAPRO) 10 MG tablet            (Z72.0) Tobacco abuse  Comment: Discussed the physical, psychological, and pharmacological aspects of nicotine addiction and smoking cessation.    Discussed 2 possible regimens.  Option #1:  Chantix alone (no nicotine replacement needed), starting month pack for first month, thencontinuing month pack for 3-6 additional months.  Reviewed the main side effects of the medication and directed him to the company web site for further information and gave pt information handouts (if available)  Option #2:  Recommended nicotine replacement with either gum or patches in a descending manner starting the first day of not smoking.  Also discussed the medication Zyban in the use use smoking cessation.  Recommended starting with 150 mg first thing in the morning for 4 days, then adding a second dose late in the afternoon or early evening.  Discussed the potential side effects including but not limited to seizure, GI upset, insomnia, headache, weight loss.    After discussion, the patient decided to try Chantix.  Reviewed side effects of this medication including, but not limited to, GI upset, headache, nightmares/weird dreams, increased depression/anxiety.  She was instructed to stop the medicine immediately should she experience any adverse side effects.  Contact this after the first month and let us know no side effects and confirm efficacy and I  can send prescriptions to the pharmacist.  Plan: varenicline (CHANTIX CONSTANTINO) 0.5 MG X 11 & 1 MG X         42 tablet                     Nicotine/Tobacco Cessation  She reports that she has been smoking cigarettes. She has never used smokeless tobacco.  Nicotine/Tobacco Cessation Plan  Pharmacotherapies : varenicline (Chantix)  Self help information given to patient            Subjective   Rebecca is a 44 year old, presenting for the following health issues:   Follow Up (Med refills)        12/16/2024     2:42 PM   Additional Questions   Roomed by Amanda MARTINEZ CMA     HPI     Depression and Anxiety   How are you doing with your depression since your last visit? Improved   How are you doing with your anxiety since your last visit?  Improved   Are you having other symptoms that might be associated with depression or anxiety? No  Have you had a significant life event? No   Do you have any concerns with your use of alcohol or other drugs? No    Social History     Tobacco Use    Smoking status: Every Day     Current packs/day: 0.50     Types: Cigarettes    Smokeless tobacco: Never   Vaping Use    Vaping status: Never Used   Substance Use Topics    Alcohol use: Yes     Alcohol/week: 0.0 standard drinks of alcohol     Comment: 2-3 drinks    Drug use: Yes     Types: Marijuana     Comment: occ         9/30/2019    10:47 PM 4/17/2023     5:32 PM 12/16/2024     4:59 PM   PHQ   PHQ-9 Total Score 8 10 2   Q9: Thoughts of better off dead/self-harm past 2 weeks Not at all Not at all Not at all         4/17/2023     5:32 PM 6/12/2023     5:12 PM 12/16/2024     4:59 PM   LATOYA-7 SCORE   Total Score  9 (mild anxiety)    Total Score 9 9 3         12/16/2024     4:59 PM   Last PHQ-9   1.  Little interest or pleasure in doing things 0   2.  Feeling down, depressed, or hopeless 0   3.  Trouble falling or staying asleep, or sleeping too much 0   4.  Feeling tired or having little energy 1   5.  Poor appetite or overeating 0   6.  Feeling bad  "about yourself 0   7.  Trouble concentrating 1   8.  Moving slowly or restless 0   Q9: Thoughts of better off dead/self-harm past 2 weeks 0   PHQ-9 Total Score 2   Difficulty at work, home, or with people Not difficult at all         12/16/2024     4:59 PM   LATOYA-7    1. Feeling nervous, anxious, or on edge 1   2. Not being able to stop or control worrying 0   3. Worrying too much about different things 0   4. Trouble relaxing 1   5. Being so restless that it is hard to sit still 0   6. Becoming easily annoyed or irritable 1   7. Feeling afraid, as if something awful might happen 0   LATOYA-7 Total Score 3   If you checked any problems, how difficult have they made it for you to do your work, take care of things at home, or get along with other people? Not difficult at all       Suicide Assessment Five-step Evaluation and Treatment (SAFE-T)      **I reviewed the information recorded in the patient's EPIC chart (including but not limited to medical history, surgical history, family history, problem list, medication list, and allergy list) and updated the information as indicated based on the patients reported information.         Review of Systems  Constitutional, HEENT, cardiovascular, pulmonary, gi and gu systems are negative, except as otherwise noted.      Objective    Vitals - Patient Reported  Weight (Patient Reported): 67.1 kg (148 lb)  Height (Patient Reported): 167.6 cm (5' 6\")  BMI (Based on Pt Reported Ht/Wt): 23.89  Pain Score: No Pain (0)      Vitals:  No vitals were obtained today due to virtual visit.    Physical Exam   GENERAL: alert and no distress  EYES: Eyes grossly normal to inspection.  No discharge or erythema, or obvious scleral/conjunctival abnormalities.  RESP: No audible wheeze, cough, or visible cyanosis.    SKIN: Visible skin clear. No significant rash, abnormal pigmentation or lesions.  NEURO: Cranial nerves grossly intact.  Mentation and speech appropriate for age.  PSYCH: Appropriate " affect, tone, and pace of words          Video-Visit Details    Type of service:  Video Visit     Joined the call at 12/16/2024, 5:27:55 pm.  Left the call at 12/16/2024, 5:50:05 pm.  You were on the call for 22 minutes 9 seconds .      Originating Location (pt. Location): Home    Distant Location (provider location):  On-site  Platform used for Video Visit: Marissa    The longitudinal plan of care for the diagnosis(es)/condition(s) as documented were addressed during this visit. Due to the added complexity in care, I will continue to support Rebecca in the subsequent management and with ongoing continuity of care.      Signed Electronically by: Claudio Menon MD

## 2025-07-13 ENCOUNTER — HEALTH MAINTENANCE LETTER (OUTPATIENT)
Age: 45
End: 2025-07-13

## (undated) DEVICE — SU VICRYL 4-0 PS-2 18" UND J496H

## (undated) DEVICE — ESU LIGASURE MARYLAND LAPAROSCOPIC SLR/DVDR 5MMX37CM LF1937

## (undated) DEVICE — PREP CHLORAPREP 26ML TINTED ORANGE  260815

## (undated) DEVICE — ESU HOLDER LAP INST DISP PURPLE LONG 330MM H-PRO-330

## (undated) DEVICE — SUCTION IRR STRYKERFLOW II W/TIP 250-070-520

## (undated) DEVICE — GLOVE PROTEXIS BLUE W/NEU-THERA 6.5  2D73EB65

## (undated) DEVICE — PACK LAP CHOLE SLC15LCFSD

## (undated) DEVICE — ENDO TROCAR FIRST ENTRY KII FIOS Z-THRD 05X100MM CTF03

## (undated) DEVICE — LINEN TOWEL PACK X5 5464

## (undated) DEVICE — STPL ENDO HANDLE GIA ULTRA UNIVERSAL STD EGIAUSTND

## (undated) DEVICE — ENDO TROCAR OPTICAL 05MM VERSAPORT PLUS W/FIX CAN ONB5STF

## (undated) DEVICE — ENDO POUCH REIACATCH 2.44" 10MM CATCH10

## (undated) DEVICE — ESU GROUND PAD UNIVERSAL W/O CORD

## (undated) DEVICE — SUCTION CANISTER MEDIVAC LINER 3000ML W/LID 65651-530

## (undated) DEVICE — SU VICRYL 0 UR-6 27" J603H

## (undated) DEVICE — STPL ENDO RELOAD 45MM VASCULAR MEDIUM TAN EGIA45AVM

## (undated) DEVICE — SOL NACL 0.9% INJ 1000ML BAG 2B1324X

## (undated) DEVICE — DEVICE SUTURE GRASPER TROCAR CLOSURE 14GA PMITCSG

## (undated) DEVICE — ENDO TROCAR OPTICAL 12MM VERSAPORT PLUS W/FIX CAN ONB12STF

## (undated) DEVICE — GLOVE PROTEXIS MICRO 6.0  2D73PM60

## (undated) RX ORDER — PROPOFOL 10 MG/ML
INJECTION, EMULSION INTRAVENOUS
Status: DISPENSED
Start: 2018-03-16

## (undated) RX ORDER — FENTANYL CITRATE 50 UG/ML
INJECTION, SOLUTION INTRAMUSCULAR; INTRAVENOUS
Status: DISPENSED
Start: 2018-03-16

## (undated) RX ORDER — DEXAMETHASONE SODIUM PHOSPHATE 4 MG/ML
INJECTION, SOLUTION INTRA-ARTICULAR; INTRALESIONAL; INTRAMUSCULAR; INTRAVENOUS; SOFT TISSUE
Status: DISPENSED
Start: 2018-03-16

## (undated) RX ORDER — PIPERACILLIN SODIUM, TAZOBACTAM SODIUM 3; .375 G/15ML; G/15ML
INJECTION, POWDER, LYOPHILIZED, FOR SOLUTION INTRAVENOUS
Status: DISPENSED
Start: 2018-03-16

## (undated) RX ORDER — LIDOCAINE HYDROCHLORIDE 20 MG/ML
INJECTION, SOLUTION EPIDURAL; INFILTRATION; INTRACAUDAL; PERINEURAL
Status: DISPENSED
Start: 2018-03-16

## (undated) RX ORDER — BUPIVACAINE HYDROCHLORIDE AND EPINEPHRINE 5; 5 MG/ML; UG/ML
INJECTION, SOLUTION EPIDURAL; INTRACAUDAL; PERINEURAL
Status: DISPENSED
Start: 2018-03-16

## (undated) RX ORDER — KETOROLAC TROMETHAMINE 30 MG/ML
INJECTION, SOLUTION INTRAMUSCULAR; INTRAVENOUS
Status: DISPENSED
Start: 2018-03-16

## (undated) RX ORDER — LIDOCAINE HYDROCHLORIDE 10 MG/ML
INJECTION, SOLUTION EPIDURAL; INFILTRATION; INTRACAUDAL; PERINEURAL
Status: DISPENSED
Start: 2018-03-16

## (undated) RX ORDER — ONDANSETRON 2 MG/ML
INJECTION INTRAMUSCULAR; INTRAVENOUS
Status: DISPENSED
Start: 2018-03-16